# Patient Record
Sex: FEMALE | Race: WHITE | HISPANIC OR LATINO | Employment: UNEMPLOYED | URBAN - METROPOLITAN AREA
[De-identification: names, ages, dates, MRNs, and addresses within clinical notes are randomized per-mention and may not be internally consistent; named-entity substitution may affect disease eponyms.]

---

## 2021-12-22 ENCOUNTER — HOSPITAL ENCOUNTER (EMERGENCY)
Facility: CLINIC | Age: 21
Discharge: HOME | End: 2021-12-23
Attending: EMERGENCY MEDICINE | Admitting: EMERGENCY MEDICINE
Payer: COMMERCIAL

## 2021-12-22 DIAGNOSIS — J45.21 MILD INTERMITTENT ASTHMA WITH EXACERBATION: ICD-10-CM

## 2021-12-22 DIAGNOSIS — J06.9 VIRAL URI WITH COUGH: ICD-10-CM

## 2021-12-22 LAB
FLUAV RNA SPEC QL NAA+PROBE: NEGATIVE
FLUBV RNA RESP QL NAA+PROBE: NEGATIVE
SARS-COV-2 RNA RESP QL NAA+PROBE: NEGATIVE

## 2021-12-22 PROCEDURE — C9803 HOPD COVID-19 SPEC COLLECT: HCPCS

## 2021-12-22 PROCEDURE — 87636 SARSCOV2 & INF A&B AMP PRB: CPT | Performed by: EMERGENCY MEDICINE

## 2021-12-22 PROCEDURE — 99285 EMERGENCY DEPT VISIT HI MDM: CPT | Mod: 25

## 2021-12-22 ASSESSMENT — MIFFLIN-ST. JEOR: SCORE: 1732.04

## 2021-12-23 ENCOUNTER — APPOINTMENT (OUTPATIENT)
Dept: GENERAL RADIOLOGY | Facility: CLINIC | Age: 21
End: 2021-12-23
Attending: EMERGENCY MEDICINE
Payer: COMMERCIAL

## 2021-12-23 VITALS
OXYGEN SATURATION: 100 % | HEART RATE: 102 BPM | WEIGHT: 220 LBS | DIASTOLIC BLOOD PRESSURE: 81 MMHG | BODY MASS INDEX: 38.98 KG/M2 | TEMPERATURE: 98.6 F | SYSTOLIC BLOOD PRESSURE: 149 MMHG | HEIGHT: 63 IN | RESPIRATION RATE: 18 BRPM

## 2021-12-23 LAB
DEPRECATED S PYO AG THROAT QL EIA: NEGATIVE
GROUP A STREP BY PCR: NOT DETECTED

## 2021-12-23 PROCEDURE — 250N000013 HC RX MED GY IP 250 OP 250 PS 637: Performed by: EMERGENCY MEDICINE

## 2021-12-23 PROCEDURE — 71046 X-RAY EXAM CHEST 2 VIEWS: CPT

## 2021-12-23 PROCEDURE — 87651 STREP A DNA AMP PROBE: CPT | Performed by: EMERGENCY MEDICINE

## 2021-12-23 PROCEDURE — 94640 AIRWAY INHALATION TREATMENT: CPT

## 2021-12-23 RX ORDER — ALBUTEROL SULFATE 90 UG/1
2 AEROSOL, METERED RESPIRATORY (INHALATION) ONCE
Status: COMPLETED | OUTPATIENT
Start: 2021-12-23 | End: 2021-12-23

## 2021-12-23 RX ORDER — PREDNISONE 20 MG/1
TABLET ORAL
Qty: 10 TABLET | Refills: 0 | Status: CANCELLED | OUTPATIENT
Start: 2021-12-23

## 2021-12-23 RX ORDER — BENZONATATE 200 MG/1
200 CAPSULE ORAL 3 TIMES DAILY PRN
Qty: 20 CAPSULE | Refills: 0 | Status: CANCELLED | OUTPATIENT
Start: 2021-12-23

## 2021-12-23 RX ADMIN — ALBUTEROL SULFATE 2 PUFF: 90 AEROSOL, METERED RESPIRATORY (INHALATION) at 00:44

## 2021-12-23 ASSESSMENT — ENCOUNTER SYMPTOMS
COUGH: 1
FEVER: 0
SORE THROAT: 1

## 2021-12-23 NOTE — ED TRIAGE NOTES
Patient presents with complaints of a cough that began about a week ago, worsening over the past couple of days. Patient took an at-home Covid test yesterday which was negative.

## 2021-12-23 NOTE — ED PROVIDER NOTES
"  History   Chief Complaint:  Cough       The history is provided by the patient.      Ty Forte is a 21 year old female with history of asthma who presents with congestion. The patient began to experience congestion 1 week ago and reports she has begun to feel worse with time. She now additionally has a cough with intermittent mucus and has chest pain and a sore throat when coughing. She has not taken any medications for her symptoms. She denies any fever or ear pain. The patient does have a history of asthma, but reports she ran out of her inhaler a few months ago. She denies any change of pregnancy and is currently on her menstrual cycle. Denies leg pain/swelling, past history of PE/DVT.    Review of Systems   Constitutional: Negative for fever.   HENT: Positive for congestion and sore throat. Negative for ear pain.    Respiratory: Positive for cough.    Cardiovascular: Positive for chest pain.   All other systems reviewed and are negative.    Allergies:  No Known Allergies    Medications:  The patient is currently on no regular medications.    Past Medical History:     Asthma     Social History:  Presents unaccompanied.   PCP: No primary care provider on file.     Physical Exam     Patient Vitals for the past 24 hrs:   BP Temp Temp src Pulse Resp SpO2 Height Weight   12/23/21 0130 -- -- -- -- -- 100 % -- --   12/23/21 0000 (!) 149/81 -- -- -- 18 96 % -- --   12/22/21 2248 (!) 140/82 98.6  F (37  C) Temporal 102 18 97 % 1.6 m (5' 3\") 99.8 kg (220 lb)       Physical Exam  Nursing note and vitals reviewed.  Constitutional:  Appears well-developed and well-nourished. Dry sounding cough. No visible respiratory distress.  HENT:    Sinus is non tender. TM's are clear.   Head:    Atraumatic.   Mouth/Throat:   Oropharynx is clear and moist. No oropharyngeal exudate.   Eyes:    Pupils are equal, round, and reactive to light.   Neck:    Normal range of motion. Neck supple.      No tracheal deviation present. No " thyromegaly present.   Cardiovascular:  Normal rate, regular rhythm, no murmur   Pulmonary/Chest: Breath sounds are clear and equal without wheezes or crackles.  Abdominal:   Soft. Bowel sounds are normal. Exhibits no distension and      no mass. There is no tenderness.      There is no rebound and no guarding.   Musculoskeletal:  Exhibits no edema.   Lymphadenopathy:  No cervical adenopathy.   Neurological:   Alert and oriented to person, place, and time.   Skin:    Skin is warm and dry. No rash noted. No pallor.     Emergency Department Course     Imaging:  XR Chest 2 Views   Final Result   IMPRESSION: Negative chest.        Report per radiology    Laboratory:  Labs Ordered and Resulted from Time of ED Arrival to Time of ED Departure   INFLUENZA A/B & SARS-COV2 PCR MULTIPLEX - Normal       Result Value    Influenza A PCR Negative      Influenza B PCR Negative      SARS CoV2 PCR Negative     STREPTOCOCCUS A RAPID SCREEN W REFELX TO PCR - Normal    Group A Strep antigen Negative     GROUP A STREPTOCOCCUS PCR THROAT SWAB       Emergency Department Course:    Reviewed:  I reviewed nursing notes, vitals and past medical history    Assessments:  0021 I obtained history and examined the patient as noted above.   0142 I rechecked the patient and explained findings.     Interventions:  0044 Albuterol inhaler, 2 puff, Inhalation     Disposition:  The patient was discharged to home.     Impression & Plan     Medical Decision Making:  This patient has a history of asthma and symptoms consistent with acute upper respiratory infection. She tested negative for covid, influenza, and strep and I feel this is likely a viral illness exacerbating her asthma. She was given albuterol inhaler here and discharged on prednisone and tessalon pearls for cough. She was instructed to return as needed for if her symptoms worsen including difficulty breathing or chest pain. She should follow up in her primary care clinic this week. Their was no  concern for bacterial pneumonia being her chest Xray was negative and their was no symptoms concerning for pulmonary embolism. I felt she could be safely discharged.     Diagnosis:    ICD-10-CM    1. Mild intermittent asthma with exacerbation  J45.21 Primary Care Referral   2. Viral URI with cough  J06.9 Primary Care Referral       Discharge Medications:  New Prescriptions    No medications on file       Scribe Disclosure:  Susan ZAMBRANO, am serving as a scribe at 12:05 AM on 12/23/2021 to document services personally performed by Teri Montgomery MD based on my observations and the provider's statements to me.            Teri Montgomery MD  12/23/21 0431       Teri Montgomery MD  12/23/21 0432

## 2022-10-08 ENCOUNTER — NURSE TRIAGE (OUTPATIENT)
Dept: NURSING | Facility: CLINIC | Age: 22
End: 2022-10-08

## 2022-10-08 NOTE — TELEPHONE ENCOUNTER
"Patient calling with concerns of vaginal bleeding for the past 2 weeks. Patient reports that she is needing to change her pad, a thin panty liner that is meant for heavier flow, 3 times at night and 4-6 times during the day. Patient reports that she has been seeing clots, and a couple days ago it was spotting with clots. Today the bleeding is lightening up but now the cramping is more painful and only on the left side, and previously it was cramping all around. Patient is on birth control and \"has messed up her pills two weeks ago\".  Patient has stopped taking her pill completely two days ago and now it just hurts. Abdominal pain is 7/10 on left side only and has been constant for 2 hours now.   Patient has just treated with ibuprofen just prior to call  Protocol recommends see hcp within 4 hours.  Patient agrees to present to  and will call back with any worsening symptoms.   Camelia Shukla RN   10/08/22 12:02 PM  [facility] Nurse Advisor      Reason for Disposition    [1] Constant abdominal pain AND [2] present > 2 hours    Additional Information    Negative: Shock suspected (e.g., cold/pale/clammy skin, too weak to stand, low BP, rapid pulse)    Negative: Difficult to awaken or acting confused (e.g., disoriented, slurred speech)    Negative: Passed out (i.e., lost consciousness, collapsed and was not responding)    Negative: Sounds like a life-threatening emergency to the triager    Negative: Followed a genital area injury (e.g., vagina, vulva)    Negative: Pregnant 20 or more weeks    Negative: Pregnant < 20 weeks  (less than 5 months)    Negative: Postpartum (from 0 to 6 weeks after delivery)    Negative: Bleeding occurring > 12 months after menopause    Negative: Bleeding from sexual abuse or rape    Negative: [1] Vaginal discharge is main symptom AND [2] small amount of blood    Negative: SEVERE abdominal pain    Negative: SEVERE dizziness (e.g., unable to stand, requires support to walk, feels like " passing out now)    Negative: SEVERE vaginal bleeding (e.g., soaking 2 pads or tampons per hour and present 2 or more hours; 1 menstrual cup every 2 hours)    Negative: Patient sounds very sick or weak to the triager    Negative: MODERATE vaginal bleeding (e.g., soaking 1 pad or tampon per hour and present > 6 hours; 1 menstrual cup every 6 hours)    Protocols used: VAGINAL BLEEDING - [id number]

## 2022-10-25 ENCOUNTER — OFFICE VISIT (OUTPATIENT)
Dept: FAMILY MEDICINE | Facility: CLINIC | Age: 22
End: 2022-10-25
Payer: COMMERCIAL

## 2022-10-25 VITALS
RESPIRATION RATE: 16 BRPM | SYSTOLIC BLOOD PRESSURE: 122 MMHG | BODY MASS INDEX: 38.91 KG/M2 | HEIGHT: 63 IN | HEART RATE: 65 BPM | OXYGEN SATURATION: 97 % | WEIGHT: 219.6 LBS | DIASTOLIC BLOOD PRESSURE: 69 MMHG | TEMPERATURE: 98.9 F

## 2022-10-25 DIAGNOSIS — Z30.09 GENERAL COUNSELING FOR PRESCRIPTION OF ORAL CONTRACEPTIVES: ICD-10-CM

## 2022-10-25 DIAGNOSIS — Z00.00 ROUTINE HISTORY AND PHYSICAL EXAMINATION OF ADULT: Primary | ICD-10-CM

## 2022-10-25 DIAGNOSIS — Z80.49 FAMILY HISTORY OF CERVICAL CANCER: ICD-10-CM

## 2022-10-25 DIAGNOSIS — Z12.4 CERVICAL CANCER SCREENING: ICD-10-CM

## 2022-10-25 DIAGNOSIS — N92.6 IRREGULAR MENSES: ICD-10-CM

## 2022-10-25 LAB
BASOPHILS # BLD AUTO: 0 10E3/UL (ref 0–0.2)
BASOPHILS NFR BLD AUTO: 0 %
EOSINOPHIL # BLD AUTO: 0.1 10E3/UL (ref 0–0.7)
EOSINOPHIL NFR BLD AUTO: 1 %
ERYTHROCYTE [DISTWIDTH] IN BLOOD BY AUTOMATED COUNT: 13 % (ref 10–15)
HBA1C MFR BLD: 5.3 % (ref 0–5.6)
HCT VFR BLD AUTO: 40.2 % (ref 35–47)
HGB BLD-MCNC: 13.6 G/DL (ref 11.7–15.7)
IMM GRANULOCYTES # BLD: 0 10E3/UL
IMM GRANULOCYTES NFR BLD: 0 %
LYMPHOCYTES # BLD AUTO: 2.5 10E3/UL (ref 0.8–5.3)
LYMPHOCYTES NFR BLD AUTO: 27 %
MCH RBC QN AUTO: 31 PG (ref 26.5–33)
MCHC RBC AUTO-ENTMCNC: 33.8 G/DL (ref 31.5–36.5)
MCV RBC AUTO: 92 FL (ref 78–100)
MONOCYTES # BLD AUTO: 0.7 10E3/UL (ref 0–1.3)
MONOCYTES NFR BLD AUTO: 7 %
NEUTROPHILS # BLD AUTO: 6 10E3/UL (ref 1.6–8.3)
NEUTROPHILS NFR BLD AUTO: 65 %
PLATELET # BLD AUTO: 282 10E3/UL (ref 150–450)
RBC # BLD AUTO: 4.39 10E6/UL (ref 3.8–5.2)
WBC # BLD AUTO: 9.2 10E3/UL (ref 4–11)

## 2022-10-25 PROCEDURE — 99385 PREV VISIT NEW AGE 18-39: CPT | Mod: 25 | Performed by: INTERNAL MEDICINE

## 2022-10-25 PROCEDURE — 80061 LIPID PANEL: CPT | Performed by: INTERNAL MEDICINE

## 2022-10-25 PROCEDURE — 90686 IIV4 VACC NO PRSV 0.5 ML IM: CPT | Performed by: INTERNAL MEDICINE

## 2022-10-25 PROCEDURE — G0145 SCR C/V CYTO,THINLAYER,RESCR: HCPCS | Performed by: INTERNAL MEDICINE

## 2022-10-25 PROCEDURE — 90472 IMMUNIZATION ADMIN EACH ADD: CPT | Performed by: INTERNAL MEDICINE

## 2022-10-25 PROCEDURE — 90715 TDAP VACCINE 7 YRS/> IM: CPT | Performed by: INTERNAL MEDICINE

## 2022-10-25 PROCEDURE — G0124 SCREEN C/V THIN LAYER BY MD: HCPCS | Performed by: PATHOLOGY

## 2022-10-25 PROCEDURE — 90471 IMMUNIZATION ADMIN: CPT | Performed by: INTERNAL MEDICINE

## 2022-10-25 PROCEDURE — 80050 GENERAL HEALTH PANEL: CPT | Performed by: INTERNAL MEDICINE

## 2022-10-25 PROCEDURE — 36415 COLL VENOUS BLD VENIPUNCTURE: CPT | Performed by: INTERNAL MEDICINE

## 2022-10-25 PROCEDURE — 99213 OFFICE O/P EST LOW 20 MIN: CPT | Mod: 25 | Performed by: INTERNAL MEDICINE

## 2022-10-25 PROCEDURE — 83036 HEMOGLOBIN GLYCOSYLATED A1C: CPT | Performed by: INTERNAL MEDICINE

## 2022-10-25 RX ORDER — LEVONORGESTREL AND ETHINYL ESTRADIOL 0.15-0.03
1 KIT ORAL DAILY
COMMUNITY
Start: 2021-09-25 | End: 2022-12-14

## 2022-10-25 ASSESSMENT — ASTHMA QUESTIONNAIRES
ACT_TOTALSCORE: 24
ACT_TOTALSCORE: 24
QUESTION_5 LAST FOUR WEEKS HOW WOULD YOU RATE YOUR ASTHMA CONTROL: COMPLETELY CONTROLLED
QUESTION_3 LAST FOUR WEEKS HOW OFTEN DID YOUR ASTHMA SYMPTOMS (WHEEZING, COUGHING, SHORTNESS OF BREATH, CHEST TIGHTNESS OR PAIN) WAKE YOU UP AT NIGHT OR EARLIER THAN USUAL IN THE MORNING: NOT AT ALL
QUESTION_1 LAST FOUR WEEKS HOW MUCH OF THE TIME DID YOUR ASTHMA KEEP YOU FROM GETTING AS MUCH DONE AT WORK, SCHOOL OR AT HOME: NONE OF THE TIME
QUESTION_2 LAST FOUR WEEKS HOW OFTEN HAVE YOU HAD SHORTNESS OF BREATH: ONCE OR TWICE A WEEK
QUESTION_4 LAST FOUR WEEKS HOW OFTEN HAVE YOU USED YOUR RESCUE INHALER OR NEBULIZER MEDICATION (SUCH AS ALBUTEROL): NOT AT ALL

## 2022-10-25 ASSESSMENT — ENCOUNTER SYMPTOMS
PALPITATIONS: 0
CHILLS: 0
ABDOMINAL PAIN: 0
SORE THROAT: 0
ARTHRALGIAS: 0
HEMATURIA: 0
DIARRHEA: 0
SHORTNESS OF BREATH: 0
PARESTHESIAS: 0
MYALGIAS: 0
HEADACHES: 0
JOINT SWELLING: 0
NAUSEA: 0
HEMATOCHEZIA: 0
NERVOUS/ANXIOUS: 1
FREQUENCY: 0
FEVER: 0
EYE PAIN: 0
COUGH: 0
DYSURIA: 0
CONSTIPATION: 0
DIZZINESS: 0
WEAKNESS: 0
HEARTBURN: 0

## 2022-10-25 ASSESSMENT — PAIN SCALES - GENERAL: PAINLEVEL: NO PAIN (0)

## 2022-10-25 NOTE — PROGRESS NOTES
SUBJECTIVE:   CC: Reanna is an 22 year old who presents for preventive health visit.       Patient has been advised of split billing requirements and indicates understanding: Yes  Healthy Habits:     Getting at least 3 servings of Calcium per day:  NO    Bi-annual eye exam:  Yes    Dental care twice a year:  NO    Sleep apnea or symptoms of sleep apnea:  None    Diet:  Regular (no restrictions) and Breakfast skipped    Frequency of exercise:  None    Taking medications regularly:  Yes    Medication side effects:  None    PHQ-2 Total Score: 2    Additional concerns today:  Yes      Today's PHQ-2 Score:   PHQ-2 ( 1999 Pfizer) 10/25/2022   Q1: Little interest or pleasure in doing things 1   Q2: Feeling down, depressed or hopeless 1   PHQ-2 Score 2   Q1: Little interest or pleasure in doing things Several days   Q2: Feeling down, depressed or hopeless Several days   PHQ-2 Score 2       Abuse: Current or Past (Physical, Sexual or Emotional) - No  Do you feel safe in your environment? Yes    Have you ever done Advance Care Planning? (For example, a Health Directive, POLST, or a discussion with a medical provider or your loved ones about your wishes): No, advance care planning information given to patient to review.  Patient declined advance care planning discussion at this time.    Social History     Tobacco Use     Smoking status: Some Days     Types: Cigarettes, Other     Smokeless tobacco: Current   Substance Use Topics     Alcohol use: Not on file     If you drink alcohol do you typically have >3 drinks per day or >7 drinks per week? No    Alcohol Use 10/25/2022   Prescreen: >3 drinks/day or >7 drinks/week? No   Prescreen: >3 drinks/day or >7 drinks/week? -          Reviewed and updated as needed this visit by clinical staff   Tobacco  Allergies  Meds              Reviewed and updated as needed this visit by Provider                 Former PCP: none  [facility]  Problem list and medications  "reviewed and updated. See below for additional notes.  Social: smokes marijuana, no etoh    Significant FHx: mother-cervical cancer  Exercise/Diet: as per above    Pap: due  Contraception: states she is off her ocp due to irregular menses, bleeding while on ocp, last menstrual cycle 2 weeks ago, not sexually active; denies vaginal odor or discharge    Flu: due, agreeable  Tdap: due, agreeable  COVID: due for booster, she defers  HPV: due per HM, she thinks she may have had these and will check her records    Denies specific concerns or questions otherwise    Review of Systems   Constitutional: Negative for chills and fever.   HENT: Negative for congestion, ear pain, hearing loss and sore throat.    Eyes: Negative for pain and visual disturbance.   Respiratory: Negative for cough and shortness of breath.    Cardiovascular: Negative for chest pain, palpitations and peripheral edema.   Gastrointestinal: Negative for abdominal pain, constipation, diarrhea, heartburn, hematochezia and nausea.   Genitourinary: Negative for dysuria, frequency, genital sores, hematuria and urgency.   Musculoskeletal: Negative for arthralgias, joint swelling and myalgias.   Skin: Negative for rash.   Neurological: Negative for dizziness, weakness, headaches and paresthesias.   Psychiatric/Behavioral: Positive for mood changes. The patient is nervous/anxious.           OBJECTIVE:   /69 (BP Location: Left arm, Patient Position: Sitting, Cuff Size: Adult Large)   Pulse 65   Temp 98.9  F (37.2  C) (Oral)   Resp 16   Ht 1.605 m (5' 3.19\")   Wt 99.6 kg (219 lb 9.6 oz)   SpO2 97%   BMI 38.67 kg/m    Physical Exam    GENERAL APPEARANCE: AAOx3, no distress. Well developed.    RESP: Lungs CTA bilaterally. No w/r/r. No distress     CV: RRR, S1/S2 present. No m/r/c.     ABDOMEN:  soft, nontender, no distention. No rebound or guarding.     EXT: No c/c/e in lower extremities b/l. No rashes or deformities noted.    MSK: ROM and strength intact " in four extremities. No gross deformities noted.    SKIN: no suspicious lesions or rashes    NEURO: AAOx3, Motor function intact w/ 5/5 strength bilaterally to UE and LE. Sensation intact bilaterally. Speech is fluent and comprehension intact. No gait abnormalities noted.    PSYCH: appropriate mood and affect.     LYMPHATICS: No cervical adenopathy   (female): External genitalia without masses, swelling, lesions or tenderness. Vagina is pink and moist without lesions or discharge. Cervix not clearly visualized, nontender without lesions or erosions.   BREAST: bilateral exam without masses, tenderness or nipple discharge; no palpable axillary masses or adenopathy      ASSESSMENT/PLAN:   Reanna was seen today for physical.    Diagnoses and all orders for this visit:    Routine history and physical examination of adult  Tdap and Flu vaccine administered today-patient agreeable  She defers COVID booster  Will check her records on HPV vaccine and if has not yet this, I recommend she proceed with series  -     Lipid panel reflex to direct LDL Fasting; Future  -     Hemoglobin A1c; Future    Cervical cancer screening  Cervix poorly visualized. Swab attempted. If cervical cells not seen on swab, recommend she repeat this with ob/gyn given her family history. Discussed with her, she is agreeable.  -     Pap Screen only - recommended age 21 - 24 years  -     Ob/Gyn Referral; Future    General counseling for prescription of oral contraceptives  Irregular menses  Family history of cervical cancer   Recommend she establish with ob/gyn for ongoing management of her pap exam in setting of mother with cervical cancer. She will also discuss contraception plan and irregular menses with them. Referral provided and she is agreeable to call and schedule.  -     CBC with Platelets & Differential; Future  -     Comprehensive metabolic panel; Future  -     Ob/Gyn Referral; Future  -     TSH with free T4 reflex; Future    Other orders  -      TDAP VACCINE (Adacel, Boostrix)  -     INFLUENZA VACCINE IM > 6 MONTHS VALENT IIV4 (AFLURIA/FLUZONE)            She reports that she has been smoking cigarettes and other. She uses smokeless tobacco.      Counseling Resources:  ATP IV Guidelines  Pooled Cohorts Equation Calculator  Breast Cancer Risk Calculator  BRCA-Related Cancer Risk Assessment: FHS-7 Tool  FRAX Risk Assessment  ICSI Preventive Guidelines  Dietary Guidelines for Americans, 2010  USDA's MyPlate  ASA Prophylaxis  Lung CA Screening    Keara Butts DO  [facility]

## 2022-10-25 NOTE — NURSING NOTE
Prior to immunization administration, verified patients identity using patient s name and date of birth. Please see Immunization Activity for additional information.     Screening Questionnaire for Adult Immunization    Are you sick today?   No   Do you have allergies to medications, food, a vaccine component or latex?   No   Have you ever had a serious reaction after receiving a vaccination?   No   Do you have a long-term health problem with heart, lung, kidney, or metabolic disease (e.g., diabetes), asthma, a blood disorder, no spleen, complement component deficiency, a cochlear implant, or a spinal fluid leak?  Are you on long-term aspirin therapy?   No   Do you have cancer, leukemia, HIV/AIDS, or any other immune system problem?   No   Do you have a parent, brother, or sister with an immune system problem?   Yes   In the past 3 months, have you taken medications that affect  your immune system, such as prednisone, other steroids, or anticancer drugs; drugs for the treatment of rheumatoid arthritis, Crohn s disease, or psoriasis; or have you had radiation treatments?   No   Have you had a seizure, or a brain or other nervous system problem?   No   During the past year, have you received a transfusion of blood or blood    products, or been given immune (gamma) globulin or antiviral drug?   No   For women: Are you pregnant or is there a chance you could become       pregnant during the next month?   No   Have you received any vaccinations in the past 4 weeks?   No     Immunization questionnaire was positive for at least one answer.  Notified .        Per orders of Dr. Butts, injection of TDAP given by Debbie Beverly MA. Patient instructed to remain in clinic for 15 minutes afterwards, and to report any adverse reaction to me immediately.       Screening performed by Debbie Beverly MA on 10/25/2022 at 3:12 PM.

## 2022-10-26 LAB
ALBUMIN SERPL-MCNC: 3.7 G/DL (ref 3.4–5)
ALP SERPL-CCNC: 116 U/L (ref 40–150)
ALT SERPL W P-5'-P-CCNC: 40 U/L (ref 0–50)
ANION GAP SERPL CALCULATED.3IONS-SCNC: 7 MMOL/L (ref 3–14)
AST SERPL W P-5'-P-CCNC: 21 U/L (ref 0–45)
BILIRUB SERPL-MCNC: 0.5 MG/DL (ref 0.2–1.3)
BUN SERPL-MCNC: 13 MG/DL (ref 7–30)
CALCIUM SERPL-MCNC: 8.9 MG/DL (ref 8.5–10.1)
CHLORIDE BLD-SCNC: 105 MMOL/L (ref 94–109)
CHOLEST SERPL-MCNC: 168 MG/DL
CO2 SERPL-SCNC: 25 MMOL/L (ref 20–32)
CREAT SERPL-MCNC: 0.82 MG/DL (ref 0.52–1.04)
FASTING STATUS PATIENT QL REPORTED: NO
GFR SERPL CREATININE-BSD FRML MDRD: >90 ML/MIN/1.73M2
GLUCOSE BLD-MCNC: 85 MG/DL (ref 70–99)
HDLC SERPL-MCNC: 40 MG/DL
LDLC SERPL CALC-MCNC: 110 MG/DL
NONHDLC SERPL-MCNC: 128 MG/DL
POTASSIUM BLD-SCNC: 4.2 MMOL/L (ref 3.4–5.3)
PROT SERPL-MCNC: 7.9 G/DL (ref 6.8–8.8)
SODIUM SERPL-SCNC: 137 MMOL/L (ref 133–144)
TRIGL SERPL-MCNC: 91 MG/DL
TSH SERPL DL<=0.005 MIU/L-ACNC: 0.62 MU/L (ref 0.4–4)

## 2022-10-31 LAB
BKR LAB AP GYN ADEQUACY: ABNORMAL
BKR LAB AP GYN INTERPRETATION: ABNORMAL
BKR LAB AP HPV REFLEX: ABNORMAL
BKR LAB AP PREVIOUS ABNORMAL: ABNORMAL
PATH REPORT.COMMENTS IMP SPEC: ABNORMAL
PATH REPORT.COMMENTS IMP SPEC: ABNORMAL
PATH REPORT.RELEVANT HX SPEC: ABNORMAL

## 2022-11-01 ENCOUNTER — PATIENT OUTREACH (OUTPATIENT)
Dept: FAMILY MEDICINE | Facility: CLINIC | Age: 22
End: 2022-11-01

## 2022-12-07 NOTE — PROGRESS NOTES
SUBJECTIVE:                                                   Reanna Crawford is a 22 year old female who presents to clinic today for the following health issue(s):  Patient presents with:  Consult: Discuss pap results and plan     HPI:  Accompanied by her mom.    Has been on OCPs since age 16. Was having heavy periods with a lot of cramping. Is happy with her current pill. WOuld like refills. [facility]     Would like to discuss her pap results.  Vapes and uses [employer].    Has not had the HPV vaccine yet, is open to it    C/o a scabed lesion on her upper lip near nose. Is recurrent. Has remote hx of cold sores as child, this is different.    Has been stressed lately. Is open to seeing a counselor.     Completed review of PMH, SocHx, SurHx, FHx, medications, and care everywhere reviewed. Epic updated.      Patient's last menstrual period was 11/27/2022 (exact date)..     Patient is not sexually active, G0  Using not sexually active for contraception.    reports that she has been smoking cigarettes and other. She uses smokeless tobacco.  Tobacco Cessation Action Plan: Information offered: Patient not interested at this time  STD testing offered?  Declined    Health maintenance updated:  yes    Today's PHQ-2 Score:   PHQ-2 ( 1999 Pfizer) 10/25/2022   Q1: Little interest or pleasure in doing things 1   Q2: Feeling down, depressed or hopeless 1   PHQ-2 Score 2   Q1: Little interest or pleasure in doing things Several days   Q2: Feeling down, depressed or hopeless Several days   PHQ-2 Score 2     Today's PHQ-9 Score:   PHQ-9 SCORE 12/14/2022   PHQ-9 Total Score 12     Today's COLT-7 Score:   COLT-7 SCORE 12/14/2022   Total Score 12       Problem list and histories reviewed & adjusted, as indicated.  Additional history: as documented.    Patient Active Problem List   Diagnosis     Family history of cervical cancer     LGSIL on Pap smear of cervix     Past Surgical History:   Procedure Laterality  "Date     head staples      age 4, had fishing hook in head     MOUTH SURGERY      had two front teeth pulled out age 4      Social History     Tobacco Use     Smoking status: Some Days     Types: Cigarettes, Other     Smokeless tobacco: Current   Substance Use Topics     Alcohol use: Yes      Problem (# of Occurrences) Relation (Name,Age of Onset)    Diabetes (2) Mother, Father    Heart Disease (1) Maternal Grandmother    Hypertension (2) Mother, Maternal Grandmother    Hyperlipidemia (3) Brother, Maternal Grandmother, Maternal Grandfather    Uterine Cancer (1) Mother    No Known Problems (4) Sister, Paternal Grandmother, Paternal Grandfather, Other            Current Outpatient Medications   Medication Sig     levonorgestrel-ethinyl estradiol (NORDETTE) 0.15-30 MG-MCG tablet Take 1 tablet by mouth daily     mupirocin (BACTROBAN) 2 % external ointment Apply topically 3 times daily for 5 days . Wash hands after application.     No current facility-administered medications for this visit.     No Known Allergies    ROS:  12 point review of systems negative other than symptoms noted below or in the HPI.        OBJECTIVE:     /70   Ht 1.6 m (5' 3\")   Wt 97.5 kg (215 lb)   LMP 11/27/2022 (Exact Date)   BMI 38.09 kg/m    Body mass index is 38.09 kg/m .    Exam:  Constitutional:  Appearance: Well nourished, well developed alert, in no acute distress  Skin: General Inspection:  No rashes present, 5mm scabbed flat erythematous lesion just under left nostril near midline present, no areas of discoloration.  Neurologic:  Mental Status:  Oriented X3.  Normal strength and tone, sensory exam grossly normal, mentation intact and speech normal.    Psychiatric:  Mentation appears normal and affect normal/bright.     ASSESSMENT/PLAN:                                                        ICD-10-CM    1. Low grade squamous intraepithelial lesion on cytologic smear of cervix (LGSIL)  R87.612       2. Depression, unspecified " depression type  F32.A Adult Mental Health  Referral      3. Impetigo  L01.00 mupirocin (BACTROBAN) 2 % external ointment      4. OCP (oral contraceptive pills) initiation  Z30.011 levonorgestrel-ethinyl estradiol (NORDETTE) 0.15-30 MG-MCG tablet      5. Need for HPV vaccine  Z23       6. Need for HPV vaccination  Z23 HPV, IM (9 - 26 YRS) - Gardasil 9          Patient Instructions   To the Emergency Department as needed or call after hours crisis line at [phone] or [phone]. [place] Crisis Text Line: Text [place] to 480386  or  Suicide LifeLine Chat: [email or web address]/chat/    To the Emergency Department as needed or call after hours crisis line at [phone] or [phone]. [place] Crisis Text Line: Text [place] to 175339  or  Suicide LifeLine Chat: [email or web address]     Community Resources:    National Suicide Prevention Lifeline: 339.712.3924 (TTY: 208.497.3723). Call anytime for help.  (www.suicidepreventionlifeline.org)  National [place] on Mental Illness (www.[place].org): 785.943.8799 or 526-689-9559.   Mental Health Association (www.mentalhealth.org): 905.789.6759 or 070-169-8020.  [place] Crisis Text Line: Text [place] to 560534  Suicide LifeLine Chat: [email or web address]        -Impetigo, chronic. Rx topical mupirocin  -Discussed HPV, pap screening, transmission, vaccine, lifestyle changes such as stopping smoking/vaping, sleep.   REcommend repeat pap in one year  -DIcussed HPV vaccine, risks and benefits. Open to HPV vaccine today. Reviewed the 3 step series  -Depression. Referral to therapist. Resources given for crisis situations, discussed she may call us as well.   -Refill OCP, doing well.     (40 minutes was spent on the date of the encounter doing chart review, review and interpretation of pertinent pap test results, history and/or exam, documentation, patient counseling.)    Kerri Heard Masters, [facility] [place]

## 2022-12-14 ENCOUNTER — OFFICE VISIT (OUTPATIENT)
Dept: OBGYN | Facility: CLINIC | Age: 22
End: 2022-12-14
Payer: COMMERCIAL

## 2022-12-14 VITALS
HEIGHT: 63 IN | SYSTOLIC BLOOD PRESSURE: 122 MMHG | WEIGHT: 215 LBS | DIASTOLIC BLOOD PRESSURE: 70 MMHG | BODY MASS INDEX: 38.09 KG/M2

## 2022-12-14 DIAGNOSIS — L01.00 IMPETIGO: ICD-10-CM

## 2022-12-14 DIAGNOSIS — Z23 NEED FOR HPV VACCINE: ICD-10-CM

## 2022-12-14 DIAGNOSIS — F32.A DEPRESSION, UNSPECIFIED DEPRESSION TYPE: ICD-10-CM

## 2022-12-14 DIAGNOSIS — Z23 NEED FOR HPV VACCINATION: ICD-10-CM

## 2022-12-14 DIAGNOSIS — R87.612 LOW GRADE SQUAMOUS INTRAEPITHELIAL LESION ON CYTOLOGIC SMEAR OF CERVIX (LGSIL): Primary | ICD-10-CM

## 2022-12-14 DIAGNOSIS — Z30.011 OCP (ORAL CONTRACEPTIVE PILLS) INITIATION: ICD-10-CM

## 2022-12-14 PROCEDURE — 99204 OFFICE O/P NEW MOD 45 MIN: CPT | Mod: 25 | Performed by: OBSTETRICS & GYNECOLOGY

## 2022-12-14 PROCEDURE — 90471 IMMUNIZATION ADMIN: CPT | Performed by: OBSTETRICS & GYNECOLOGY

## 2022-12-14 PROCEDURE — 90651 9VHPV VACCINE 2/3 DOSE IM: CPT | Performed by: OBSTETRICS & GYNECOLOGY

## 2022-12-14 RX ORDER — LEVONORGESTREL AND ETHINYL ESTRADIOL 0.15-0.03
1 KIT ORAL DAILY
Qty: 84 TABLET | Refills: 4 | Status: SHIPPED | OUTPATIENT
Start: 2022-12-14 | End: 2023-07-31

## 2022-12-14 RX ORDER — MUPIROCIN 20 MG/G
OINTMENT TOPICAL 3 TIMES DAILY
Qty: 1 G | Refills: 4 | Status: SHIPPED | OUTPATIENT
Start: 2022-12-14 | End: 2022-12-19

## 2022-12-14 ASSESSMENT — ANXIETY QUESTIONNAIRES
IF YOU CHECKED OFF ANY PROBLEMS ON THIS QUESTIONNAIRE, HOW DIFFICULT HAVE THESE PROBLEMS MADE IT FOR YOU TO DO YOUR WORK, TAKE CARE OF THINGS AT HOME, OR GET ALONG WITH OTHER PEOPLE: SOMEWHAT DIFFICULT
5. BEING SO RESTLESS THAT IT IS HARD TO SIT STILL: SEVERAL DAYS
2. NOT BEING ABLE TO STOP OR CONTROL WORRYING: SEVERAL DAYS
1. FEELING NERVOUS, ANXIOUS, OR ON EDGE: SEVERAL DAYS
GAD7 TOTAL SCORE: 12
7. FEELING AFRAID AS IF SOMETHING AWFUL MIGHT HAPPEN: MORE THAN HALF THE DAYS
3. WORRYING TOO MUCH ABOUT DIFFERENT THINGS: MORE THAN HALF THE DAYS
GAD7 TOTAL SCORE: 12
6. BECOMING EASILY ANNOYED OR IRRITABLE: NEARLY EVERY DAY

## 2022-12-14 ASSESSMENT — PATIENT HEALTH QUESTIONNAIRE - PHQ9
5. POOR APPETITE OR OVEREATING: MORE THAN HALF THE DAYS
SUM OF ALL RESPONSES TO PHQ QUESTIONS 1-9: 12

## 2022-12-14 NOTE — PATIENT INSTRUCTIONS
To the Emergency Department as needed or call after hours crisis line at [phone] or [phone]. [place] Crisis Text Line: Text [place] to 694603  or  Suicide LifeLine Chat: [email or web address]/chat/    To the Emergency Department as needed or call after hours crisis line at [phone] or [phone]. [place] Crisis Text Line: Text [place] to 452819  or  Suicide LifeLine Chat: [email or web address]     Community Resources:    National Suicide Prevention Lifeline: 448.157.9548 (TTY: 399.149.5233). Call anytime for help.  (www.suicidepreventionlifeline.org)  National [place] on Mental Illness (www.[place].org): 381.434.2275 or 584-538-6588.   Mental Health Association (www.mentalhealth.org): 513.944.6389 or 288-302-8930.  [place] Crisis Text Line: Text [place] to 453493  Suicide LifeLine Chat: [email or web address]/chat

## 2023-02-08 DIAGNOSIS — Z23 NEED FOR HPV VACCINE: Primary | ICD-10-CM

## 2023-02-15 ENCOUNTER — ALLIED HEALTH/NURSE VISIT (OUTPATIENT)
Dept: NURSING | Facility: CLINIC | Age: 23
End: 2023-02-15
Payer: COMMERCIAL

## 2023-02-15 DIAGNOSIS — Z23 NEED FOR HPV VACCINE: ICD-10-CM

## 2023-02-15 PROCEDURE — 99207 PR NO CHARGE NURSE ONLY: CPT

## 2023-02-15 PROCEDURE — 90471 IMMUNIZATION ADMIN: CPT

## 2023-02-15 PROCEDURE — 90651 9VHPV VACCINE 2/3 DOSE IM: CPT

## 2023-02-15 NOTE — PROGRESS NOTES
Prior to immunization administration, verified patients identity using patient s name and date of birth. Please see Immunization Activity for additional information.     Screening Questionnaire for Adult Immunization    Are you sick today?   No   Do you have allergies to medications, food, a vaccine component or latex?   No   Have you ever had a serious reaction after receiving a vaccination?   No   Do you have a long-term health problem with heart, lung, kidney, or metabolic disease (e.g., diabetes), asthma, a blood disorder, no spleen, complement component deficiency, a cochlear implant, or a spinal fluid leak?  Are you on long-term aspirin therapy?   No   Do you have cancer, leukemia, HIV/AIDS, or any other immune system problem?   No   Do you have a parent, brother, or sister with an immune system problem?   No   In the past 3 months, have you taken medications that affect  your immune system, such as prednisone, other steroids, or anticancer drugs; drugs for the treatment of rheumatoid arthritis, Crohn s disease, or psoriasis; or have you had radiation treatments?   No   Have you had a seizure, or a brain or other nervous system problem?   No   During the past year, have you received a transfusion of blood or blood    products, or been given immune (gamma) globulin or antiviral drug?   No   For women: Are you pregnant or is there a chance you could become       pregnant during the next month?   No   Have you received any vaccinations in the past 4 weeks?   No     Immunization questionnaire answers were all negative.        Per orders of Dr. Hebert, injection of Gardasil given by Tiffanie Valero CMA. Patient instructed to remain in clinic for 15 minutes afterwards, and to report any adverse reaction to me immediately.       Screening performed by Tiffanie Valero CMA on 2/15/2023 at 10:01 AM.

## 2023-06-14 ENCOUNTER — ALLIED HEALTH/NURSE VISIT (OUTPATIENT)
Dept: OBGYN | Facility: CLINIC | Age: 23
End: 2023-06-14
Payer: COMMERCIAL

## 2023-06-14 DIAGNOSIS — Z23 NEED FOR HPV VACCINE: ICD-10-CM

## 2023-06-14 PROCEDURE — 99207 PR NO CHARGE NURSE ONLY: CPT

## 2023-06-14 PROCEDURE — 90471 IMMUNIZATION ADMIN: CPT

## 2023-06-14 PROCEDURE — 90651 9VHPV VACCINE 2/3 DOSE IM: CPT

## 2023-07-10 DIAGNOSIS — Z30.011 OCP (ORAL CONTRACEPTIVE PILLS) INITIATION: ICD-10-CM

## 2023-07-10 RX ORDER — LEVONORGESTREL AND ETHINYL ESTRADIOL 0.15-0.03
1 KIT ORAL DAILY
Qty: 84 TABLET | Refills: 4 | OUTPATIENT
Start: 2023-07-10

## 2023-07-10 NOTE — TELEPHONE ENCOUNTER
"Requested Prescriptions   Pending Prescriptions Disp Refills     levonorgestrel-ethinyl estradiol (NORDETTE) 0.15-30 MG-MCG tablet 84 tablet 4     Sig: Take 1 tablet by mouth daily       Contraceptives Protocol Passed - 7/10/2023  8:21 AM        Passed - Patient is not a current smoker if age is 35 or older        Passed - Recent (12 mo) or future (30 days) visit within the authorizing provider's specialty     Patient has had an office visit with the authorizing provider or a provider within the authorizing providers department within the previous 12 mos or has a future within next 30 days. See \"Patient Info\" tab in inbasket, or \"Choose Columns\" in Meds & Orders section of the refill encounter.              Passed - Medication is active on med list        Passed - No active pregnancy on record        Passed - No positive pregnancy test in past 12 months           Refills available  Eileen Dewey RN on 7/10/2023 at 9:20 AM    "

## 2023-07-31 DIAGNOSIS — Z30.011 OCP (ORAL CONTRACEPTIVE PILLS) INITIATION: ICD-10-CM

## 2023-07-31 RX ORDER — LEVONORGESTREL AND ETHINYL ESTRADIOL 0.15-0.03
1 KIT ORAL DAILY
Qty: 112 TABLET | Refills: 1 | Status: SHIPPED | OUTPATIENT
Start: 2023-07-31 | End: 2023-10-17

## 2023-07-31 NOTE — TELEPHONE ENCOUNTER
Reason for call:  Medication   If this is a refill request, has the caller requested the refill from the pharmacy already? Yes  Will the patient be using a [facility]? N/A  Name of the pharmacy and phone number for the current request: [facility]    Name of the medication requested: Nordette (bc pill)    Other request: Pt called stating that she had previously requested a refill for this rx but it was denied saying refills remaining. Pt states that she has been continously taking the pill and skipping her periods so she is now out of the med and needs a refill. Pt asks if we can write the refill to reflect continual usage?    Phone number to reach patient:  Cell number on file:    Telephone Information:   Mobile [phone]   Mobile [phone]       Best Time:  any    Can we leave a detailed message on this number?  YES    Travel screening: Not Applicable

## 2023-07-31 NOTE — TELEPHONE ENCOUNTER
"Requested Prescriptions   Signed Prescriptions Disp Refills    levonorgestrel-ethinyl estradiol (NORDETTE) 0.15-30 MG-MCG tablet 112 tablet 1     Sig: Take 1 tablet by mouth daily Take active pills continuously       Contraceptives Protocol Passed - 7/31/2023  8:43 AM        Passed - Patient is not a current smoker if age is 35 or older        Passed - Recent (12 mo) or future (30 days) visit within the authorizing provider's specialty     Patient has had an office visit with the authorizing provider or a provider within the authorizing providers department within the previous 12 mos or has a future within next 30 days. See \"Patient Info\" tab in inbasket, or \"Choose Columns\" in Meds & Orders section of the refill encounter.              Passed - Medication is active on med list        Passed - No active pregnancy on record        Passed - No positive pregnancy test in past 12 months               Prescription approved per [facility] Refill Protocol.    Jacqueline Kahn RN on 7/31/2023 at 10:38 AM    "

## 2023-09-12 ENCOUNTER — OFFICE VISIT (OUTPATIENT)
Dept: FAMILY MEDICINE | Facility: CLINIC | Age: 23
End: 2023-09-12
Payer: COMMERCIAL

## 2023-09-12 VITALS
RESPIRATION RATE: 16 BRPM | DIASTOLIC BLOOD PRESSURE: 68 MMHG | HEART RATE: 71 BPM | TEMPERATURE: 98.7 F | BODY MASS INDEX: 34.77 KG/M2 | WEIGHT: 196.3 LBS | OXYGEN SATURATION: 96 % | SYSTOLIC BLOOD PRESSURE: 109 MMHG

## 2023-09-12 DIAGNOSIS — Z79.899 MEDICATION MANAGEMENT: ICD-10-CM

## 2023-09-12 DIAGNOSIS — Z13.29 SCREENING FOR THYROID DISORDER: ICD-10-CM

## 2023-09-12 DIAGNOSIS — G43.109 MIGRAINE WITH AURA AND WITHOUT STATUS MIGRAINOSUS, NOT INTRACTABLE: ICD-10-CM

## 2023-09-12 DIAGNOSIS — R11.0 NAUSEA: Primary | ICD-10-CM

## 2023-09-12 DIAGNOSIS — Z11.59 NEED FOR HEPATITIS C SCREENING TEST: ICD-10-CM

## 2023-09-12 DIAGNOSIS — Z12.4 CERVICAL CANCER SCREENING: ICD-10-CM

## 2023-09-12 DIAGNOSIS — F41.9 ANXIETY: ICD-10-CM

## 2023-09-12 DIAGNOSIS — Z23 NEED FOR PROPHYLACTIC VACCINATION AND INOCULATION AGAINST INFLUENZA: ICD-10-CM

## 2023-09-12 DIAGNOSIS — Z11.4 SCREENING FOR HIV (HUMAN IMMUNODEFICIENCY VIRUS): ICD-10-CM

## 2023-09-12 DIAGNOSIS — R10.13 EPIGASTRIC PAIN: ICD-10-CM

## 2023-09-12 LAB
ALBUMIN SERPL BCG-MCNC: 4.3 G/DL (ref 3.5–5.2)
ALP SERPL-CCNC: 80 U/L (ref 35–104)
ALT SERPL W P-5'-P-CCNC: 20 U/L (ref 0–50)
ANION GAP SERPL CALCULATED.3IONS-SCNC: 9 MMOL/L (ref 7–15)
AST SERPL W P-5'-P-CCNC: 23 U/L (ref 0–45)
BILIRUB SERPL-MCNC: 0.4 MG/DL
BUN SERPL-MCNC: 8.3 MG/DL (ref 6–20)
CALCIUM SERPL-MCNC: 9.3 MG/DL (ref 8.6–10)
CHLORIDE SERPL-SCNC: 107 MMOL/L (ref 98–107)
CREAT SERPL-MCNC: 0.94 MG/DL (ref 0.51–0.95)
DEPRECATED HCO3 PLAS-SCNC: 24 MMOL/L (ref 22–29)
EGFRCR SERPLBLD CKD-EPI 2021: 87 ML/MIN/1.73M2
ERYTHROCYTE [DISTWIDTH] IN BLOOD BY AUTOMATED COUNT: 13.3 % (ref 10–15)
GLUCOSE SERPL-MCNC: 87 MG/DL (ref 70–99)
HCT VFR BLD AUTO: 41.1 % (ref 35–47)
HCV AB SERPL QL IA: NONREACTIVE
HGB BLD-MCNC: 13.3 G/DL (ref 11.7–15.7)
HIV 1+2 AB+HIV1 P24 AG SERPL QL IA: NONREACTIVE
MCH RBC QN AUTO: 30.9 PG (ref 26.5–33)
MCHC RBC AUTO-ENTMCNC: 32.4 G/DL (ref 31.5–36.5)
MCV RBC AUTO: 95 FL (ref 78–100)
PLATELET # BLD AUTO: 261 10E3/UL (ref 150–450)
POTASSIUM SERPL-SCNC: 4.4 MMOL/L (ref 3.4–5.3)
PROT SERPL-MCNC: 7.5 G/DL (ref 6.4–8.3)
RBC # BLD AUTO: 4.31 10E6/UL (ref 3.8–5.2)
SODIUM SERPL-SCNC: 140 MMOL/L (ref 136–145)
TSH SERPL DL<=0.005 MIU/L-ACNC: 0.6 UIU/ML (ref 0.3–4.2)
WBC # BLD AUTO: 7.7 10E3/UL (ref 4–11)

## 2023-09-12 PROCEDURE — 90471 IMMUNIZATION ADMIN: CPT | Performed by: INTERNAL MEDICINE

## 2023-09-12 PROCEDURE — 99215 OFFICE O/P EST HI 40 MIN: CPT | Mod: 25 | Performed by: INTERNAL MEDICINE

## 2023-09-12 PROCEDURE — 84443 ASSAY THYROID STIM HORMONE: CPT | Performed by: INTERNAL MEDICINE

## 2023-09-12 PROCEDURE — 80053 COMPREHEN METABOLIC PANEL: CPT | Performed by: INTERNAL MEDICINE

## 2023-09-12 PROCEDURE — 85027 COMPLETE CBC AUTOMATED: CPT | Performed by: INTERNAL MEDICINE

## 2023-09-12 PROCEDURE — 90686 IIV4 VACC NO PRSV 0.5 ML IM: CPT | Performed by: INTERNAL MEDICINE

## 2023-09-12 PROCEDURE — 86803 HEPATITIS C AB TEST: CPT | Performed by: INTERNAL MEDICINE

## 2023-09-12 PROCEDURE — 87389 HIV-1 AG W/HIV-1&-2 AB AG IA: CPT | Performed by: INTERNAL MEDICINE

## 2023-09-12 PROCEDURE — 36415 COLL VENOUS BLD VENIPUNCTURE: CPT | Performed by: INTERNAL MEDICINE

## 2023-09-12 RX ORDER — OMEPRAZOLE 20 MG/1
20 TABLET, DELAYED RELEASE ORAL DAILY
Qty: 30 TABLET | Refills: 1 | Status: SHIPPED | OUTPATIENT
Start: 2023-09-12 | End: 2023-10-17

## 2023-09-12 RX ORDER — SUMATRIPTAN 50 MG/1
50 TABLET, FILM COATED ORAL
Qty: 9 TABLET | Refills: 1 | Status: SHIPPED | OUTPATIENT
Start: 2023-09-12 | End: 2023-10-17

## 2023-09-12 RX ORDER — PROGESTERONE 100 MG/1
100 CAPSULE ORAL AT BEDTIME
COMMUNITY
Start: 2023-08-25 | End: 2023-10-17

## 2023-09-12 RX ORDER — CIPROFLOXACIN 250 MG/1
1 TABLET, FILM COATED ORAL
COMMUNITY
Start: 2023-09-07 | End: 2023-10-17

## 2023-09-12 RX ORDER — PROPRANOLOL HYDROCHLORIDE 10 MG/1
10 TABLET ORAL 2 TIMES DAILY
Qty: 60 TABLET | Refills: 3 | Status: SHIPPED | OUTPATIENT
Start: 2023-09-12 | End: 2023-10-17

## 2023-09-12 RX ORDER — ONDANSETRON 4 MG/1
4 TABLET, ORALLY DISINTEGRATING ORAL EVERY 8 HOURS PRN
Qty: 15 TABLET | Refills: 1 | Status: SHIPPED | OUTPATIENT
Start: 2023-09-12

## 2023-09-12 ASSESSMENT — PAIN SCALES - GENERAL: PAINLEVEL: NO PAIN (0)

## 2023-09-12 ASSESSMENT — ASTHMA QUESTIONNAIRES: ACT_TOTALSCORE: 21

## 2023-09-12 NOTE — RESULT ENCOUNTER NOTE
Ernst Forte    This is to inform you regarding your test result.    HIV 1&2 Antibody is negative.  Hepatitis C Antibody is negative.  TSH which is thyroid hormone is normal.  The testing of your blood sugar, kidney function, liver function and electrolytes was normal.  CBC result which includes white count Hemoglobin and  Platelet Counts is normal.       Sincerely,      Dr.Nasima Manoj MD,FACP

## 2023-09-12 NOTE — PATIENT INSTRUCTIONS
Lab work today  Start taking omeprazole 20 mg daily for stomach  Use zofran as needed for nausea  Please call radiology by dialing  [phone] to schedule your ultrasound   Start taking propranolol  10 mg twice a day for prevention of headache .  Use Imitrex when headache comes on as soon as you know prn  You are due for pap test so make appointment with your gynecologist     Follow up in 4-6 weeks     Seek sooner medical attention if there is any worsening of symptoms or problems.

## 2023-09-12 NOTE — PROGRESS NOTES
Assessment & Plan     Reanna was seen today for nausea.    Diagnoses and all orders for this visit:    Nausea  -     CBC with platelets; Future  -     Comprehensive metabolic panel; Future  -     omeprazole (PRILOSEC OTC) 20 MG EC tablet; Take 1 tablet (20 mg) by mouth daily  -     ondansetron (ZOFRAN ODT) 4 MG ODT tab; Take 1 tablet (4 mg) by mouth every 8 hours as needed for nausea  -     US Abdomen Limited; Future  She is feeling nauseous for the last 1 month  It is getting worse  It is intermittent she is also having some epigastric discomfort  No vomiting or fever, bowel movements are okay  I ordered lab work as well as ultrasound  I put her on omeprazole and gave her nausea medication  If needed I will refer her to GI   she will follow-up with me in 4 to 6 weeks  She is on oral contraceptive as well as progesterone  Oral contraceptive was given to her by her gynecologist but progesterone was given to her by her hormone doctor  I am not sure why she is taking both  She is also on Cipro for 10 days for UTI  Usually short courses should be prescribed  But again this is from her other hormone doctor    Epigastric pain  -     Comprehensive metabolic panel; Future  -     omeprazole (PRILOSEC OTC) 20 MG EC tablet; Take 1 tablet (20 mg) by mouth daily  -     US Abdomen Limited; Future  See the note above    Migraine with aura and without status migrainosus, not intractable  -     propranolol (INDERAL) 10 MG tablet; Take 1 tablet (10 mg) by mouth 2 times daily to prevent migraine  -     SUMAtriptan (IMITREX) 50 MG tablet; Take 1 tablet (50 mg) by mouth at onset of headache for migraine May repeat in 2 hours. Max 4 tablets/24 hours.  She has longstanding history of migraine headache and sometimes it can happen 4 times a week   she usually takes over-the-counter stuff  She has family history of migraine in her mother  I discussed preventative and abortive treatment  Educated her about it  I wanted to give her  "topiramate but it would interact with her oral contraceptive so she is not interested in that  She agreed to try propranolol as it will help with anxiety as well as it will help with prevention of migraine  Will titrate the dose slowly  Follow-up in 4 to 6 weeks  Educated her about taking Imitrex as soon as the headache comes  Explained that it will take time to kick in as far as propranolol is concerned    Anxiety  She is longstanding history of using weed for recreational purposes and to treat her anxiety   marijuana can also make person nauseous  But she does not agree as she has been using it for long time to treat her anxiety    Screening for HIV (human immunodeficiency virus)  -     HIV Antigen Antibody Combo; Future    Need for hepatitis C screening test  -     Hepatitis C Screen Reflex to HCV RNA Quant and Genotype; Future    Cervical cancer screening  Reminded her about scheduling an appointment with her gynecologist Dr. Hebert    Screening for thyroid disorder  -     TSH with free T4 reflex; Future    Medication management  -     Comprehensive metabolic panel; Future    Other orders  -     REVIEW OF HEALTH MAINTENANCE PROTOCOL ORDERS     Preventive health counseling was also done.       BMI:   Estimated body mass index is 34.77 kg/m  as calculated from the following:    Height as of 12/14/22: 1.6 m (5' 3\").    Weight as of this encounter: 89 kg (196 lb 4.8 oz).       See Patient Instructions  Patient Instructions   Lab work today  Start taking omeprazole 20 mg daily for stomach  Use zofran as needed for nausea  Please call radiology by dialing  [phone] to schedule your ultrasound   Start taking propranolol  10 mg twice a day for prevention of headache .  Use Imitrex when headache comes on as soon as you know prn  You are due for pap test so make appointment with your gynecologist     Follow up in 4-6 weeks     Seek sooner medical attention if there is any worsening of symptoms or " problems.        Alice Aranda MD  [facility] KIRAN Forte is a 23 year old, presenting for the following health issues:  Nausea      History of Present Illness       Reason for visit:  Nausea  Symptom onset:  3-4 weeks ago    She eats 2-3 servings of fruits and vegetables daily.She consumes 3 sweetened beverage(s) daily.She exercises with enough effort to increase her heart rate 10 to 19 minutes per day.  She exercises with enough effort to increase her heart rate 6 days per week.   She is taking medications regularly.     Feeling nauseous  Follows hormonal doctor    Taking Prometrium for one week  No fever   Bm are ok   Nausea mostly every morning   History of migraine  Use to take ibuprofen   Acetaminophen            Review of Systems   Constitutional, HEENT, cardiovascular, pulmonary, GI, , musculoskeletal, neuro, skin, endocrine and psych systems are negative, except as otherwise noted.        Objective    /68 (BP Location: Right arm, Patient Position: Sitting, Cuff Size: Adult Regular)   Pulse 71   Temp 98.7  F (37.1  C) (Oral)   Resp 16   Wt 89 kg (196 lb 4.8 oz)   LMP 08/25/2023 (Approximate)   SpO2 96%   BMI 34.77 kg/m    Body mass index is 34.77 kg/m .  Physical Exam       GENERAL APPEARANCE: healthy, alert and no distress  EYES: Eyes grossly normal to inspection, PERRL and conjunctivae and sclerae normal  HENT: ear canals and TM's normal and nose and mouth without ulcers or lesions  NECK: no adenopathy  RESP: lungs clear to auscultation - no rales, rhonchi or wheezes  CV: regular rates and rhythm, normal S1 S2, no S3      40 minutes spent on the date of the encounter doing chart review, history and exam, documentation and further activities as noted above    Disclaimer: This note consists of symbols derived from keyboarding, dictation and/or voice recognition software. As a result, there may be errors in the script that have gone undetected. Please  consider this when interpreting information found in this chart.

## 2023-10-17 ENCOUNTER — OFFICE VISIT (OUTPATIENT)
Dept: OBGYN | Facility: CLINIC | Age: 23
End: 2023-10-17
Payer: COMMERCIAL

## 2023-10-17 VITALS
HEIGHT: 64 IN | WEIGHT: 198 LBS | BODY MASS INDEX: 33.8 KG/M2 | DIASTOLIC BLOOD PRESSURE: 68 MMHG | SYSTOLIC BLOOD PRESSURE: 110 MMHG

## 2023-10-17 DIAGNOSIS — N89.8 VAGINAL ODOR: ICD-10-CM

## 2023-10-17 DIAGNOSIS — R87.612 LOW GRADE SQUAMOUS INTRAEPITHELIAL LESION ON CYTOLOGIC SMEAR OF CERVIX (LGSIL): Primary | ICD-10-CM

## 2023-10-17 DIAGNOSIS — Z30.011 OCP (ORAL CONTRACEPTIVE PILLS) INITIATION: ICD-10-CM

## 2023-10-17 DIAGNOSIS — F32.A DEPRESSION, UNSPECIFIED DEPRESSION TYPE: ICD-10-CM

## 2023-10-17 DIAGNOSIS — G47.8 POOR SLEEP PATTERN: ICD-10-CM

## 2023-10-17 DIAGNOSIS — Z12.4 SCREENING FOR CERVICAL CANCER: ICD-10-CM

## 2023-10-17 LAB
BACTERIAL VAGINOSIS VAG-IMP: NEGATIVE
CANDIDA DNA VAG QL NAA+PROBE: NOT DETECTED
CANDIDA GLABRATA / CANDIDA KRUSEI DNA: NOT DETECTED
T VAGINALIS DNA VAG QL NAA+PROBE: NOT DETECTED

## 2023-10-17 PROCEDURE — G0145 SCR C/V CYTO,THINLAYER,RESCR: HCPCS | Performed by: OBSTETRICS & GYNECOLOGY

## 2023-10-17 PROCEDURE — 0352U MULTIPLEX VAGINAL PANEL BY PCR: CPT | Performed by: OBSTETRICS & GYNECOLOGY

## 2023-10-17 PROCEDURE — G0124 SCREEN C/V THIN LAYER BY MD: HCPCS

## 2023-10-17 PROCEDURE — 99395 PREV VISIT EST AGE 18-39: CPT | Performed by: OBSTETRICS & GYNECOLOGY

## 2023-10-17 PROCEDURE — 99213 OFFICE O/P EST LOW 20 MIN: CPT | Mod: 25 | Performed by: OBSTETRICS & GYNECOLOGY

## 2023-10-17 RX ORDER — LEVONORGESTREL AND ETHINYL ESTRADIOL 0.15-0.03
1 KIT ORAL DAILY
Qty: 112 TABLET | Refills: 3 | Status: SHIPPED | OUTPATIENT
Start: 2023-10-17 | End: 2024-09-30

## 2023-10-17 RX ORDER — ALBUTEROL SULFATE 90 UG/1
1-2 AEROSOL, METERED RESPIRATORY (INHALATION)
COMMUNITY
Start: 2023-09-11

## 2023-10-17 ASSESSMENT — ANXIETY QUESTIONNAIRES
GAD7 TOTAL SCORE: 13
6. BECOMING EASILY ANNOYED OR IRRITABLE: MORE THAN HALF THE DAYS
GAD7 TOTAL SCORE: 13
2. NOT BEING ABLE TO STOP OR CONTROL WORRYING: MORE THAN HALF THE DAYS
7. FEELING AFRAID AS IF SOMETHING AWFUL MIGHT HAPPEN: MORE THAN HALF THE DAYS
3. WORRYING TOO MUCH ABOUT DIFFERENT THINGS: MORE THAN HALF THE DAYS
5. BEING SO RESTLESS THAT IT IS HARD TO SIT STILL: MORE THAN HALF THE DAYS
1. FEELING NERVOUS, ANXIOUS, OR ON EDGE: SEVERAL DAYS
IF YOU CHECKED OFF ANY PROBLEMS ON THIS QUESTIONNAIRE, HOW DIFFICULT HAVE THESE PROBLEMS MADE IT FOR YOU TO DO YOUR WORK, TAKE CARE OF THINGS AT HOME, OR GET ALONG WITH OTHER PEOPLE: VERY DIFFICULT

## 2023-10-17 ASSESSMENT — PATIENT HEALTH QUESTIONNAIRE - PHQ9
SUM OF ALL RESPONSES TO PHQ QUESTIONS 1-9: 10
5. POOR APPETITE OR OVEREATING: MORE THAN HALF THE DAYS

## 2023-10-17 NOTE — PROGRESS NOTES
Reanna is a 23 year old  female who presents for annual exam.     Besides routine health maintenance, she has no other health concerns today .    HPI:  The patient's PCP is     Reports on and off vaginal odor.  Started noticing this about 1-2 months ago.  No over the counter infection before.  No history of BV.    Doesn't always do continuous birth control. Depends on if she will be with her partner when she is due for cycle.    Has history depression/anxiety but has not been on any medications.  Does feel she has good support    Has a lot of troubles with sleep and if doesn't sleep well gets worsening headaches.  Has never done sleep study. States will wake up multiple times in a night. Works with a [employer] office. Was started on progesterone in addition to her OCP to help with sleep.  Thinks it might have helped, but reports abnormal period.     GYNECOLOGIC HISTORY:    Patient's last menstrual period was 10/10/2023 (approximate).    Regular menses? no  Length of menses: 3 days    Her current contraception method is: oral contraceptives.  She  reports that she has quit smoking. Her smoking use included cigarettes and other. She uses smokeless tobacco.    Patient is sexually active.  STD testing offered?  Accepted  Last PHQ-9 score on record =       10/17/2023    11:16 AM   PHQ-9 SCORE   PHQ-9 Total Score 10     Last GAD7 score on record =       10/17/2023    11:16 AM   COLT-7 SCORE   Total Score 13     Alcohol Score = 3    HEALTH MAINTENANCE:  Cholesterol: (  Cholesterol   Date Value Ref Range Status   10/25/2022 168 <200 mg/dL Final      Last Mammo: Not applicable, Result: Not applicable, Next Mammo: Due at age 40   Pap: (  Lab Results   Component Value Date    GYNINTERP  10/25/2022     Low-grade squamous intraepithelial lesion (LSIL) encompassing HPV/mild dysplasia/CIN1      Colonoscopy:  N/A, Result: Not applicable, Next Colonoscopy: age 45  Dexa:  N/A    Health maintenance updated:   "yes    HISTORY:  OB History    Para Term  AB Living   0 0 0 0 0 0   SAB IAB Ectopic Multiple Live Births   0 0 0 0 0       Patient Active Problem List   Diagnosis    Family history of cervical cancer    LGSIL on Pap smear of cervix     Past Surgical History:   Procedure Laterality Date    head staples      age 4, had fishing hook in head    MOUTH SURGERY      had two front teeth pulled out age 4      Social History     Tobacco Use    Smoking status: Former     Types: Cigarettes, Other    Smokeless tobacco: Current   Substance Use Topics    Alcohol use: Yes     Comment: rarely      Problem (# of Occurrences) Relation (Name,Age of Onset)    Diabetes (2) Mother, Father    Heart Disease (1) Maternal Grandmother    Hypertension (2) Mother, Maternal Grandmother    Hyperlipidemia (3) Brother, Maternal Grandmother, Maternal Grandfather    Uterine Cancer (1) Mother    No Known Problems (4) Sister, Paternal Grandmother, Paternal Grandfather, Other              Current Outpatient Medications   Medication Sig    albuterol (PROAIR HFA/PROVENTIL HFA/VENTOLIN HFA) 108 (90 Base) MCG/ACT inhaler Inhale 1-2 puffs into the lungs    levonorgestrel-ethinyl estradiol (NORDETTE) 0.15-30 MG-MCG tablet Take 1 tablet by mouth daily Take active pills continuously    ondansetron (ZOFRAN ODT) 4 MG ODT tab Take 1 tablet (4 mg) by mouth every 8 hours as needed for nausea     No current facility-administered medications for this visit.     Allergies   Allergen Reactions    Latex Rash       Past medical, surgical, social and family histories were reviewed and updated in EPIC.    ROS:   12 point review of systems negative other than symptoms noted below or in the HPI.    EXAM:  /68   Ht 1.63 m (5' 4.17\")   Wt 89.8 kg (198 lb)   LMP 10/10/2023 (Approximate)   BMI 33.80 kg/m     BMI: Body mass index is 33.8 kg/m .    PHYSICAL EXAM:  Constitutional:   Appearance: Well nourished, well developed, alert, in no acute " distress  Neck:  Lymph Nodes:  No lymphadenopathy present    Thyroid:  Gland size normal, nontender, no nodules or masses present  on palpation  Chest:  Respiratory Effort:  Breathing unlabored  Cardiovascular:    Heart: Auscultation:  Regular rate, normal rhythm, no murmurs present  Breasts: Inspection of Breasts:  No lymphadenopathy present., Palpation of Breasts and Axillae:  No masses present on palpation, no breast tenderness., and Axillary Lymph Nodes:  No lymphadenopathy present.  Gastrointestinal:   Abdominal Examination:  Abdomen nontender to palpation, tone normal without rigidity or guarding, no masses present, umbilicus without lesions   Liver and Spleen:  No hepatomegaly present, liver nontender to palpation    Hernias:  No hernias present  Lymphatic: Lymph Nodes:  No other lymphadenopathy present  Skin:  General Inspection:  No rashes present, no lesions present, no areas of  discoloration  Neurologic:    Mental Status:  Oriented X3.  Normal strength and tone, sensory exam                grossly normal, mentation intact and speech normal.    Psychiatric:   Mentation appears normal and affect normal/bright.         Pelvic Exam:  External Genitalia:     Normal appearance for age, no discharge present, no tenderness present, no inflammatory lesions present, color normal  Vagina:     Normal vaginal vault without central or paravaginal defects, no discharge present, no inflammatory lesions present, no masses present  Bladder:     Nontender to palpation  Urethra:   Urethral Body:  Urethra palpation normal, urethra structural support normal   Urethral Meatus:  No erythema or lesions present  Cervix:     Appearance healthy, no lesions present, nontender to palpation, no bleeding present  Uterus:     Uterus: firm, normal sized and nontender, midplane in position.   Adnexa:     No adnexal tenderness present, no adnexal masses present  Perineum:     Perineum within normal limits, no evidence of trauma, no rashes  or skin lesions present  Anus:     Anus within normal limits, no hemorrhoids present  Inguinal Lymph Nodes:     No lymphadenopathy present  Pubic Hair:     Normal pubic hair distribution for age  Genitalia and Groin:     No rashes present, no lesions present, no areas of discoloration, no masses present    COUNSELING:   Reviewed preventive health counseling, as reflected in patient instructions  Special attention given to:        Regular exercise       Healthy diet/nutrition       Contraception       Safe sex practices/STD prevention    BMI: Body mass index is 33.8 kg/m .  Weight management plan: Discussed healthy diet and exercise guidelines    ASSESSMENT:  23 year old female with satisfactory annual exam.    ICD-10-CM    1. Low grade squamous intraepithelial lesion on cytologic smear of cervix (LGSIL)  R87.612 Pap thin layer screen only - recommended age 21 - 24 years      2. Screening for cervical cancer  Z12.4 Pap thin layer screen only - recommended age 21 - 24 years      3. Vaginal odor  N89.8 Multiplex Vaginal Panel by PCR      4. Poor sleep pattern  G47.8 Adult Sleep Eval & Management  Referral      5. Depression, unspecified depression type  F32.A Adult Mental Health  Referral      6. OCP (oral contraceptive pills) initiation  Z30.011 levonorgestrel-ethinyl estradiol (NORDETTE) 0.15-30 MG-MCG tablet          PLAN:  -LSIL pap history.  Repeat today. Follow-up pending results.  -Vaginal odor. MVP swab collected. Will contact patient with results.  -Poor sleep. Recommend sleep study for better evaluation.   -Elevated PHQ-Mental health referral placed.  -Refill OCP    (25 additional minutes were spent on the date of the encounter doing chart review, review of outside records, review and interpretation of pertinent test results, history and exam, documentation, patient counseling, and further activities as noted above in addition to typical time spent for preventative women's health exam.)      Depression Screening Follow-up        10/17/2023    11:16 AM   PHQ   PHQ-9 Total Score 10   Q9: Thoughts of better off dead/self-harm past 2 weeks Several days     Crisis resource information provided in the After Visit Summary  Patient to follow up with PCP.  Clinic staff to schedule appointment if able.  Mental Health Referral placed    Discussed the following ways the patient can remain in a safe environment:  remove alcohol, remove drugs, secure medications: including any narcotics, and be around others    I have reviewed the results of the [place] Screening and proposed plan of care. The patient agrees with the follow up and safety plan.      (35 minutes was spent on the date of the encounter doing chart review, review of outside records, review and interpretation of pertinent test results, history and exam, documentation, patient counseling, and further activities as noted above.)      Nida Andrews MD

## 2023-10-20 LAB
BKR LAB AP GYN ADEQUACY: ABNORMAL
BKR LAB AP GYN INTERPRETATION: ABNORMAL
BKR LAB AP HPV REFLEX: NO
BKR LAB AP LMP: ABNORMAL
BKR LAB AP PREVIOUS ABNL DX: ABNORMAL
BKR LAB AP PREVIOUS ABNORMAL: ABNORMAL
PATH REPORT.COMMENTS IMP SPEC: ABNORMAL
PATH REPORT.COMMENTS IMP SPEC: ABNORMAL
PATH REPORT.RELEVANT HX SPEC: ABNORMAL

## 2023-10-23 ENCOUNTER — PATIENT OUTREACH (OUTPATIENT)
Dept: OBGYN | Facility: CLINIC | Age: 23
End: 2023-10-23
Payer: COMMERCIAL

## 2023-11-07 ENCOUNTER — TRANSFERRED RECORDS (OUTPATIENT)
Dept: HEALTH INFORMATION MANAGEMENT | Facility: CLINIC | Age: 23
End: 2023-11-07
Payer: COMMERCIAL

## 2023-11-13 DIAGNOSIS — R11.0 NAUSEA: ICD-10-CM

## 2023-11-13 DIAGNOSIS — R10.13 EPIGASTRIC PAIN: ICD-10-CM

## 2023-12-07 ENCOUNTER — TRANSFERRED RECORDS (OUTPATIENT)
Dept: HEALTH INFORMATION MANAGEMENT | Facility: CLINIC | Age: 23
End: 2023-12-07
Payer: COMMERCIAL

## 2023-12-14 ENCOUNTER — MYC REFILL (OUTPATIENT)
Dept: OBGYN | Facility: CLINIC | Age: 23
End: 2023-12-14
Payer: COMMERCIAL

## 2023-12-14 ENCOUNTER — TRANSFERRED RECORDS (OUTPATIENT)
Dept: HEALTH INFORMATION MANAGEMENT | Facility: CLINIC | Age: 23
End: 2023-12-14
Payer: COMMERCIAL

## 2023-12-14 DIAGNOSIS — Z30.011 OCP (ORAL CONTRACEPTIVE PILLS) INITIATION: ICD-10-CM

## 2023-12-15 ENCOUNTER — TELEPHONE (OUTPATIENT)
Dept: OBGYN | Facility: CLINIC | Age: 23
End: 2023-12-15
Payer: COMMERCIAL

## 2023-12-15 RX ORDER — LEVONORGESTREL AND ETHINYL ESTRADIOL 0.15-0.03
1 KIT ORAL DAILY
Qty: 112 TABLET | Refills: 3 | OUTPATIENT
Start: 2023-12-15

## 2023-12-15 NOTE — TELEPHONE ENCOUNTER
"Requested Prescriptions   Pending Prescriptions Disp Refills    levonorgestrel-ethinyl estradiol (NORDETTE) 0.15-30 MG-MCG tablet 112 tablet 3     Sig: Take 1 tablet by mouth daily Take active pills continuously       Contraceptives Protocol Passed - 12/14/2023  8:36 PM        Passed - Patient is not a current smoker if age is 35 or older        Passed - Recent (12 mo) or future (30 days) visit within the authorizing provider's specialty     Patient has had an office visit with the authorizing provider or a provider within the authorizing providers department within the previous 12 mos or has a future within next 30 days. See \"Patient Info\" tab in inbasket, or \"Choose Columns\" in Meds & Orders section of the refill encounter.              Passed - Medication is active on med list        Passed - No active pregnancy on record        Passed - No positive pregnancy test in past 12 months           Refills available  Eileen Dewey RN on 12/15/2023 at 8:33 AM    "

## 2023-12-15 NOTE — TELEPHONE ENCOUNTER
Called pt and informed the Rx was sent for the full year to [facility] and from here she just notifies [facility] to prepare the refill for .    Pt verbalized understanding, in agreement with plan, and voiced no further questions.    Karey Muñoz RN on 12/15/2023 at 8:51 AM    
[facility] Call Center    Phone Message    May a detailed message be left on voicemail: yes     Reason for Call: Medication Refill Request    Has the patient contacted the pharmacy for the refill? Yes   Name of medication being requested: levonorgestrel-ethinyl estradiol (NORDETTE) 0.15-30 MG-MCG tablet       Provider who prescribed the medication: Nida Andrews  Pharmacy:     [id number] - [place], [place] - [address]    Date medication is needed: Asap    Patient got her last fill on 10/17/23 with 3 refills. States she is currently out of her script and is currently on her menstrual cycle. Script was denied when she requested a refill. Please contact patient in regards to this message. Thank you    Action Taken: Other: Women's    Travel Screening: Not Applicable                                                                   
sitting

## 2024-03-05 NOTE — PROGRESS NOTES
SUBJECTIVE:                                                   Reanna Crawford is a 23 year old female who presents to clinic today for the following health issue(s):  Patient presents with:  Vaginal Problem: CC:  painful bump on left inner labia near clitoris      HPI:  Reanna is here today for vaginal pain that started 3/5/24 morning. Feels like her left labia minora is swollen and painful. She has tried ibuprofen, but this has not helped the pain. Nothing has improved it. Worse with sitting, walking, touching. Denies discharge, bleeding, itching, rashes, spreading, N/V, fevers, or chills. Does state she had intercourse the night prior to this happening.     Patient's last menstrual period was 2024..     Patient is sexually active, .  Using oral contraceptives for contraception.    reports that she has quit smoking. Her smoking use included cigarettes and other. She uses smokeless tobacco.    STD testing offered?  Accepted    Health maintenance updated:  yes    Overdue          Never done ASTHMA ACTION PLAN (Yearly)     Never done Pneumococcal Vaccine: Pediatrics (0 to 5 Years) and At-Risk Patients (6 to 64 Years) (1 of 2 - PCV)     Never done HEPATITIS B IMMUNIZATION (1 of 3 - 19+ 3-dose series)     2022 DTAP/TDAP/TD IMMUNIZATION (2 - Td or Tdap)  Last completed: Oct 25, 2022     Never done COVID-19 Vaccine ( season)     2024 PHQ-2 (once per calendar year) (Yearly, January to December)  Last completed: Oct 17, 2023       Today's PHQ-2 Score:       3/6/2024     1:01 PM   PHQ-2 (  [employer])   Q1: Little interest or pleasure in doing things 1   Q2: Feeling down, depressed or hopeless 1   PHQ-2 Score 2     Today's PHQ-9 Score:       3/6/2024     1:01 PM   PHQ-9 SCORE   PHQ-9 Total Score 8     Today's COLT-7 Score:       3/6/2024     1:01 PM   COLT-7 SCORE   Total Score 11       Problem list and histories reviewed & adjusted, as indicated.  Additional history: as  "documented.    Patient Active Problem List   Diagnosis    Family history of cervical cancer    LGSIL on Pap smear of cervix     Past Surgical History:   Procedure Laterality Date    head staples      age 4, had fishing hook in head    MOUTH SURGERY      had two front teeth pulled out age 4      Social History     Tobacco Use    Smoking status: Former     Types: Cigarettes, Other    Smokeless tobacco: Current   Substance Use Topics    Alcohol use: Yes     Comment: rarely      Problem (# of Occurrences) Relation (Name,Age of Onset)    Diabetes (2) Mother, Father    Heart Disease (1) Maternal Grandmother    Hypertension (2) Mother, Maternal Grandmother    Hyperlipidemia (3) Brother, Maternal Grandmother, Maternal Grandfather    Uterine Cancer (1) Mother    No Known Problems (4) Sister, Paternal Grandmother, Paternal Grandfather, Other              Current Outpatient Medications   Medication Sig    albuterol (PROAIR HFA/PROVENTIL HFA/VENTOLIN HFA) 108 (90 Base) MCG/ACT inhaler Inhale 1-2 puffs into the lungs    levonorgestrel-ethinyl estradiol (NORDETTE) 0.15-30 MG-MCG tablet Take 1 tablet by mouth daily Take active pills continuously    omeprazole (PRILOSEC) 20 MG DR capsule TAKE 1 CAPSULE BY MOUTH DAILY    ondansetron (ZOFRAN ODT) 4 MG ODT tab Take 1 tablet (4 mg) by mouth every 8 hours as needed for nausea     Current Facility-Administered Medications   Medication    lidocaine (XYLOCAINE) 2 % external gel     Allergies   Allergen Reactions    Latex Rash       OBJECTIVE:     /62 (BP Location: Right arm)   Ht 1.626 m (5' 4\")   Wt 90.7 kg (200 lb)   LMP 02/05/2024   BMI 34.33 kg/m    Body mass index is 34.33 kg/m .    Exam:  Constitutional:  Appearance: Well nourished, well developed alert, in no acute distress  Neurologic:  Mental Status:  Oriented X3.  Normal strength and tone, sensory exam grossly normal, mentation intact and speech normal.       Labia minora: swollen on left side, no erythema, no signs of " infection.     In-Clinic Test Results:  No results found for this or any previous visit (from the past 24 hour(s)).    ASSESSMENT/PLAN:                                                        ICD-10-CM    1. Swelling of vagina  N89.9 lidocaine (XYLOCAINE) 2 % external gel      2. Screening for chlamydial disease  Z11.8       3. Screening for STD (sexually transmitted disease)  Z11.3 NEISSERIA GONORRHOEA PCR     CHLAMYDIA TRACHOMATIS PCR     Treponema Abs w Reflex to RPR and Titer     HIV Antigen Antibody Combo     Hepatitis B Surface  Antigen     Hepatitis C Antibody     Treponema Abs w Reflex to RPR and Titer     HIV Antigen Antibody Combo     Hepatitis B Surface  Antigen     Hepatitis C Antibody      4. Encounter for hepatitis C screening test for low risk patient  Z11.59       5. Screening for HIV (human immunodeficiency virus)  Z11.4           There are no Patient Instructions on file for this visit.    Labia Minora localized Swelling  -swelling and tenderness x D#2  -following intercourse  -denies any other symptoms, no discharge or bleeding  -no known injury to tissue  -Recommend: pelvic rest, lidocaine jelly to the area, alternate tylenol and ibuprofen, ice packs  -Return if symptoms are worsening or if you develop redness, hardening, discharge, fevers, chills, N/V      TOM Benavides [facility] [place]

## 2024-03-06 ENCOUNTER — OFFICE VISIT (OUTPATIENT)
Dept: OBGYN | Facility: CLINIC | Age: 24
End: 2024-03-06
Payer: COMMERCIAL

## 2024-03-06 VITALS
WEIGHT: 200 LBS | DIASTOLIC BLOOD PRESSURE: 62 MMHG | SYSTOLIC BLOOD PRESSURE: 122 MMHG | BODY MASS INDEX: 34.15 KG/M2 | HEIGHT: 64 IN

## 2024-03-06 DIAGNOSIS — N89.9 SWELLING OF VAGINA: Primary | ICD-10-CM

## 2024-03-06 DIAGNOSIS — Z11.8 SCREENING FOR CHLAMYDIAL DISEASE: ICD-10-CM

## 2024-03-06 DIAGNOSIS — Z11.4 SCREENING FOR HIV (HUMAN IMMUNODEFICIENCY VIRUS): ICD-10-CM

## 2024-03-06 DIAGNOSIS — Z11.59 ENCOUNTER FOR HEPATITIS C SCREENING TEST FOR LOW RISK PATIENT: ICD-10-CM

## 2024-03-06 DIAGNOSIS — Z11.3 SCREENING FOR STD (SEXUALLY TRANSMITTED DISEASE): ICD-10-CM

## 2024-03-06 LAB — T PALLIDUM AB SER QL: NONREACTIVE

## 2024-03-06 PROCEDURE — 87340 HEPATITIS B SURFACE AG IA: CPT

## 2024-03-06 PROCEDURE — 99213 OFFICE O/P EST LOW 20 MIN: CPT

## 2024-03-06 PROCEDURE — 86803 HEPATITIS C AB TEST: CPT

## 2024-03-06 PROCEDURE — 87389 HIV-1 AG W/HIV-1&-2 AB AG IA: CPT

## 2024-03-06 PROCEDURE — 36415 COLL VENOUS BLD VENIPUNCTURE: CPT

## 2024-03-06 PROCEDURE — 87491 CHLMYD TRACH DNA AMP PROBE: CPT

## 2024-03-06 PROCEDURE — 86780 TREPONEMA PALLIDUM: CPT

## 2024-03-06 RX ORDER — LIDOCAINE HYDROCHLORIDE 20 MG/ML
JELLY TOPICAL ONCE
Status: COMPLETED | OUTPATIENT
Start: 2024-03-06 | End: 2024-03-06

## 2024-03-06 RX ADMIN — LIDOCAINE HYDROCHLORIDE: 20 JELLY TOPICAL at 13:40

## 2024-03-06 ASSESSMENT — ANXIETY QUESTIONNAIRES
1. FEELING NERVOUS, ANXIOUS, OR ON EDGE: MORE THAN HALF THE DAYS
7. FEELING AFRAID AS IF SOMETHING AWFUL MIGHT HAPPEN: SEVERAL DAYS
3. WORRYING TOO MUCH ABOUT DIFFERENT THINGS: MORE THAN HALF THE DAYS
GAD7 TOTAL SCORE: 11
2. NOT BEING ABLE TO STOP OR CONTROL WORRYING: MORE THAN HALF THE DAYS
5. BEING SO RESTLESS THAT IT IS HARD TO SIT STILL: MORE THAN HALF THE DAYS
GAD7 TOTAL SCORE: 11
IF YOU CHECKED OFF ANY PROBLEMS ON THIS QUESTIONNAIRE, HOW DIFFICULT HAVE THESE PROBLEMS MADE IT FOR YOU TO DO YOUR WORK, TAKE CARE OF THINGS AT HOME, OR GET ALONG WITH OTHER PEOPLE: SOMEWHAT DIFFICULT
6. BECOMING EASILY ANNOYED OR IRRITABLE: NOT AT ALL

## 2024-03-06 ASSESSMENT — PATIENT HEALTH QUESTIONNAIRE - PHQ9
5. POOR APPETITE OR OVEREATING: MORE THAN HALF THE DAYS
SUM OF ALL RESPONSES TO PHQ QUESTIONS 1-9: 8

## 2024-03-07 ENCOUNTER — HOSPITAL ENCOUNTER (EMERGENCY)
Facility: CLINIC | Age: 24
Discharge: HOME | End: 2024-03-07
Attending: EMERGENCY MEDICINE | Admitting: EMERGENCY MEDICINE
Payer: COMMERCIAL

## 2024-03-07 VITALS
HEART RATE: 88 BPM | SYSTOLIC BLOOD PRESSURE: 138 MMHG | HEIGHT: 64 IN | TEMPERATURE: 98 F | DIASTOLIC BLOOD PRESSURE: 88 MMHG | OXYGEN SATURATION: 99 % | RESPIRATION RATE: 16 BRPM | WEIGHT: 200 LBS | BODY MASS INDEX: 34.15 KG/M2

## 2024-03-07 DIAGNOSIS — N89.8 VAGINAL IRRITATION: ICD-10-CM

## 2024-03-07 DIAGNOSIS — L03.818 CELLULITIS OF OTHER SPECIFIED SITE: ICD-10-CM

## 2024-03-07 LAB
C TRACH DNA SPEC QL NAA+PROBE: NEGATIVE
HBV SURFACE AG SERPL QL IA: NONREACTIVE
HCV AB SERPL QL IA: NONREACTIVE
HIV 1+2 AB+HIV1 P24 AG SERPL QL IA: NONREACTIVE

## 2024-03-07 PROCEDURE — 99284 EMERGENCY DEPT VISIT MOD MDM: CPT

## 2024-03-07 RX ORDER — SULFAMETHOXAZOLE/TRIMETHOPRIM 800-160 MG
1 TABLET ORAL 2 TIMES DAILY
Qty: 14 TABLET | Refills: 0 | Status: SHIPPED | OUTPATIENT
Start: 2024-03-07 | End: 2024-03-14

## 2024-03-07 RX ORDER — KETOROLAC TROMETHAMINE 10 MG/1
10 TABLET, FILM COATED ORAL EVERY 6 HOURS PRN
Qty: 12 TABLET | Refills: 0 | Status: SHIPPED | OUTPATIENT
Start: 2024-03-07

## 2024-03-07 RX ORDER — CEPHALEXIN 500 MG/1
500 CAPSULE ORAL 4 TIMES DAILY
Qty: 28 CAPSULE | Refills: 0 | Status: SHIPPED | OUTPATIENT
Start: 2024-03-07 | End: 2024-03-14

## 2024-03-07 ASSESSMENT — ACTIVITIES OF DAILY LIVING (ADL)
ADLS_ACUITY_SCORE: 33
ADLS_ACUITY_SCORE: 33

## 2024-03-07 NOTE — ED PROVIDER NOTES
"  History     Chief Complaint:  Vaginal Problem     The history is provided by the patient.      Reanna Crawford is a 23 year old female with a history of chlamydia who presents to the emergency department for vaginal problem. The patient states that 1 week ago, she shaved her vaginal region and then had sexual intercourse. She reports that 2 days after, she began experiencing left labia swelling and tenderness. She notes that she was seen by her OB/GYN yesterday. Denies drainage. Denies fever, nausea, vomiting, diarrhea. Denies dysuria. She is on birth control.    Independent Historian:   None - Patient Only    Review of External Notes:   I reviewed the patient's OB/GYN office visit from yesterday.     Medications:   Albuterol  Levonorgestrel-ethinyl estradiol  Omeprazole  Ondansetron    Past Medical History:    Anxiety  Asthma  Depression  High cholesterol  Chlamydia  Lyme disease    Past Surgical History:    Head staples  Mouth surgery     Physical Exam   Patient Vitals for the past 24 hrs:   BP Temp Temp src Pulse Resp SpO2 Height Weight   03/07/24 0739 138/88 98  F (36.7  C) Oral 88 16 99 % 1.626 m (5' 4\") 90.7 kg (200 lb)      Physical Exam  Constitutional: Vital signs reviewed.  Pleasant.  HEENT: Moist mucous membranes  Cardiovascular: Regular rate and rhythm  Pulmonary/Chest: Breathing comfortably on room air.  No audible wheezing  Musculoskeletal/Extremities: No bony deformities.  Moves all 4 extremities without difficulty.  : Swelling left labia majora.  Mild surrounding erythema, no abscess or drainage.  Neurological: Alert.  No focal deficits.  Endo: No pitting edema  Skin: No visible rash.  Psychiatric: Pleasant.    Emergency Department Course     Emergency Department Course & Assessments:    Interventions:  None     Assessments:  0904 I obtained history and examined the patient as noted above. I discussed findings and discharge with the patient. All questions answered.     Independent Interpretation " (X-rays, CTs, rhythm strip):  None    Consultations/Discussion of Management or Tests:  None     Social Determinants of Health affecting care:   None    Disposition:  The patient was discharged.     Impression & Plan      Medical Decision Making:  Patient presents emergency department concerns over pain to the left labia majora.  She saw her OB yesterday who did STD testing and gave her lidocaine gel.  She states that symptoms are worsening and she feels like the swelling is increased.  I do not see any obvious drainable abscess here today.  There is some swelling with mild irritation and erythema.  She does not have any dysuria or vaginal discharge.  Again she was already tested for chlamydia gonorrhea syphilis HIV and hepatitis yesterday some these results are back and negative some are still pending.  I do not see any drainable abscess here today.  I have discussed starting her on antibiotics to treat for possible cellulitis.  I will give her Keflex and Bactrim to cover for possible early abscess as well.  I will also give her some Toradol for pain control.  She will continue to use the gel as well as sitz bath's.  Follow-up as needed    Diagnosis:    ICD-10-CM    1. Vaginal irritation  N89.8       2. Cellulitis of other specified site  L03.818          Discharge Medications:  New Prescriptions    CEPHALEXIN (KEFLEX) 500 MG CAPSULE    Take 1 capsule (500 mg) by mouth 4 times daily for 7 days    KETOROLAC (TORADOL) 10 MG TABLET    Take 1 tablet (10 mg) by mouth every 6 hours as needed for moderate pain    SULFAMETHOXAZOLE-TRIMETHOPRIM (BACTRIM DS) 800-160 MG TABLET    Take 1 tablet by mouth 2 times daily for 7 days      Scribe Disclosure:  Yuriy ZAMBRANO, am serving as a scribe at 9:10 AM on 3/7/2024 to document services personally performed by Antelmo Ribeiro MD based on my observations and the provider's statements to me.     3/7/2024   Antelmo Ribeiro MD Walters, Brent Aaron, MD  03/07/24  0482

## 2024-03-07 NOTE — ED TRIAGE NOTES
Pt has her left labia swollen and tender   Pt noticed 3 days ago   Pt had recently shaved area and sex prior on Saturday then had issue noticed on Sunday   [facility]

## 2024-03-26 ENCOUNTER — MYC REFILL (OUTPATIENT)
Dept: OBGYN | Facility: CLINIC | Age: 24
End: 2024-03-26
Payer: COMMERCIAL

## 2024-03-26 DIAGNOSIS — Z30.011 OCP (ORAL CONTRACEPTIVE PILLS) INITIATION: ICD-10-CM

## 2024-03-27 RX ORDER — LEVONORGESTREL AND ETHINYL ESTRADIOL 0.15-0.03
1 KIT ORAL DAILY
Qty: 112 TABLET | Refills: 3 | OUTPATIENT
Start: 2024-03-27

## 2024-03-27 NOTE — TELEPHONE ENCOUNTER
"Requested Prescriptions   Pending Prescriptions Disp Refills    levonorgestrel-ethinyl estradiol (NORDETTE) 0.15-30 MG-MCG tablet 112 tablet 3     Sig: Take 1 tablet by mouth daily Take active pills continuously       Contraceptives Protocol Passed - 3/26/2024  8:32 PM        Passed - Patient is not a current smoker if age is 35 or older        Passed - Recent (12 mo) or future (30 days) visit within the authorizing provider's specialty     The patient must have completed an in-person or virtual visit within the past 12 months or has a future visit scheduled within the next 90 days with the authorizing provider s specialty.  Urgent care and e-visits do not quality as an office visit for this protocol.          Passed - Medication is active on med list        Passed - No active pregnancy on record        Passed - No positive pregnancy test in past 12 months         Last Written Prescription Date:  10/17/23  Last Fill Quantity: 112,  # refills: 3   Last office visit: 3/6/2024 ; last virtual visit: Visit date not found with prescribing provider:  Nida Andrews   Future Office Visit:  10/22/24 with Nida Andrews    Refill request refused due to :   Refills available at pharmacy.  Request sent back to pharmacy as \"duplicate\"    [employer] message sent to patient to speak to pharmacy about the need for early refill.    Mignon Regan RN on 3/27/2024 at 6:25 AM              "

## 2024-05-20 ENCOUNTER — OFFICE VISIT (OUTPATIENT)
Dept: OBGYN | Facility: CLINIC | Age: 24
End: 2024-05-20
Payer: COMMERCIAL

## 2024-05-20 VITALS
HEIGHT: 64 IN | DIASTOLIC BLOOD PRESSURE: 62 MMHG | WEIGHT: 195 LBS | SYSTOLIC BLOOD PRESSURE: 120 MMHG | BODY MASS INDEX: 33.29 KG/M2

## 2024-05-20 DIAGNOSIS — N89.8 ITCHING OF VAGINA: Primary | ICD-10-CM

## 2024-05-20 DIAGNOSIS — B37.31 YEAST INFECTION INVOLVING THE VAGINA AND SURROUNDING AREA: ICD-10-CM

## 2024-05-20 LAB
BACTERIAL VAGINOSIS VAG-IMP: NEGATIVE
CANDIDA DNA VAG QL NAA+PROBE: DETECTED
CANDIDA GLABRATA / CANDIDA KRUSEI DNA: NOT DETECTED
T VAGINALIS DNA VAG QL NAA+PROBE: NOT DETECTED

## 2024-05-20 PROCEDURE — 0352U MULTIPLEX VAGINAL PANEL BY PCR: CPT

## 2024-05-20 PROCEDURE — 99213 OFFICE O/P EST LOW 20 MIN: CPT

## 2024-05-20 NOTE — PROGRESS NOTES
SUBJECTIVE:                                                   Reanna Crawford is a 24 year old female who presents to clinic today for the following health issue(s):  Patient presents with:  Vaginal Problem: Internal vaginal itching for 2 days.    HPI:  Rigoberto presents with vaginal itching x 2 days. She denies changes in discharge, urinary symptoms, pelvic pain/pressure. Denies fever/chills, body aches, open lesions. Has not used any new products/creams/detergents in vulvovaginal area.     She uses continuous OCPs for contraception. Is sexually active with one male partner, monogamous.    Patient's last menstrual period was 2024..     Patient is sexually active, .  Using oral contraceptives for contraception.    reports that she has quit smoking. Her smoking use included cigarettes and other. She uses smokeless tobacco.    STD testing offered?  Declined    Health maintenance updated:  yes    Overdue          Never done ASTHMA ACTION PLAN (Yearly)     Never done Pneumococcal Vaccine: Pediatrics (0 to 5 Years) and At-Risk Patients (6 to 64 Years) (1 of 2 - PCV)     Never done HEPATITIS B IMMUNIZATION (1 of 3 - 19+ 3-dose series)     2022 DTAP/TDAP/TD IMMUNIZATION (2 - Td or Tdap)  Last completed: Oct 25, 2022     Never done COVID-19 Vaccine ( - - season)     MAR 12  2024 ASTHMA CONTROL TEST (Every 6 Months)  Last completed: Sep 12, 2023       Today's PHQ-2 Score:       3/6/2024     1:01 PM   PHQ-2 (  [employer])   Q1: Little interest or pleasure in doing things 1   Q2: Feeling down, depressed or hopeless 1   PHQ-2 Score 2     Today's PHQ-9 Score:       3/6/2024     1:01 PM   PHQ-9 SCORE   PHQ-9 Total Score 8     Today's COLT-7 Score:       3/6/2024     1:01 PM   COLT-7 SCORE   Total Score 11       Problem list and histories reviewed & adjusted, as indicated.  Additional history: as documented.    Patient Active Problem List   Diagnosis    Family history of cervical cancer    LGSIL on Pap  "smear of cervix     Past Surgical History:   Procedure Laterality Date    head staples      age 4, had fishing hook in head    MOUTH SURGERY      had two front teeth pulled out age 4      Social History     Tobacco Use    Smoking status: Former     Types: Cigarettes, Other    Smokeless tobacco: Current   Substance Use Topics    Alcohol use: Yes     Comment: rarely      Problem (# of Occurrences) Relation (Name,Age of Onset)    Diabetes (2) Mother, Father    Heart Disease (1) Maternal Grandmother    Hypertension (2) Mother, Maternal Grandmother    Hyperlipidemia (3) Brother, Maternal Grandmother, Maternal Grandfather    Uterine Cancer (1) Mother    No Known Problems (4) Sister, Paternal Grandmother, Paternal Grandfather, Other              Current Outpatient Medications   Medication Sig Dispense Refill    albuterol (PROAIR HFA/PROVENTIL HFA/VENTOLIN HFA) 108 (90 Base) MCG/ACT inhaler Inhale 1-2 puffs into the lungs      ketorolac (TORADOL) 10 MG tablet Take 1 tablet (10 mg) by mouth every 6 hours as needed for moderate pain 12 tablet 0    levonorgestrel-ethinyl estradiol (NORDETTE) 0.15-30 MG-MCG tablet Take 1 tablet by mouth daily Take active pills continuously 112 tablet 3    omeprazole (PRILOSEC) 20 MG DR capsule TAKE 1 CAPSULE BY MOUTH DAILY 30 capsule 0    ondansetron (ZOFRAN ODT) 4 MG ODT tab Take 1 tablet (4 mg) by mouth every 8 hours as needed for nausea 15 tablet 1     No current facility-administered medications for this visit.     Allergies   Allergen Reactions    Latex Rash     OBJECTIVE:     /62 (BP Location: Right arm)   Ht 1.626 m (5' 4\")   Wt 88.5 kg (195 lb)   LMP 04/08/2024   BMI 33.47 kg/m    Body mass index is 33.47 kg/m .    Exam:  Constitutional:  Appearance: Well nourished, well developed alert, in no acute distress  Neurologic:  Mental Status:  Oriented X3.  Normal strength and tone, sensory exam grossly normal, mentation intact and speech normal.    Psychiatric:  Mentation appears " normal and affect normal/bright.  Pelvic Exam:  External Genitalia:     Normal appearance for age, no discharge present, no tenderness present, no inflammatory lesions present, color normal  Vagina:     Normal vaginal vault without central or paravaginal defects, scant discharge present, no inflammatory lesions present, no masses present  Bladder:     Nontender to palpation  Urethra:   Urethral Body:  Urethra palpation normal, urethra structural support normal   Urethral Meatus:  No erythema or lesions present  Cervix:     Appearance healthy, no lesions present, nontender to palpation, no bleeding present  Uterus:     Uterus: firm, normal sized and nontender  Adnexa:     No adnexal tenderness present, no adnexal masses present  Perineum:     Perineum within normal limits, no evidence of trauma, no rashes or skin lesions present  Anus:     Anus within normal limits, no hemorrhoids present  Inguinal Lymph Nodes:     No lymphadenopathy present  Pubic Hair:     Normal pubic hair distribution for age  Genitalia and Groin:     No rashes present, no lesions present, no areas of discoloration, no masses present     In-Clinic Test Results:  No results found for this or any previous visit (from the past 24 hour(s)).    ASSESSMENT/PLAN:                                                        ICD-10-CM    1. Itching of vagina  N89.8 Multiplex Vaginal Panel by PCR     Multiplex Vaginal Panel by PCR          Patient Instructions   Thank you for visiting the [facility].    The vulva is the external genital area of females. The skin of the vulva can be quite sensitive. Because the area is moist and frequently subjected to friction while sitting and moving, this area can be easily injured.     There are various strategies that can be used to prevent irritation and allow the vulva to heal.      Keeping this area dry can accelerate healing.  Chemicals found in toilet tissues, laundry soaps and detergents that  come in contact with the vulva can cause irritation.  Avoiding contact with potential irritants that contain chemicals is important.  Fabric softeners in undergarments, chemicals in deodorant soaps, bubble baths, feminine hygiene spray and panty liners, etc., can all cause irritation to the vulva.      The following recommendations are specific measures that can help minimize vulvar irritation:    - Wear white 100% cotton underwear and do not wear underwear at night.  Do not wear pantyhose, tights, or other close-fitting clothes.  Enclosing this area with synthetic fibers holds both heat and moisture in the skin, conditions which potentiate the development of infections.  Tight-fitting clothes may also increase your symptoms of discomfort.    - After washing underwear, put it through at least one whole cycle with water only.  Some women have suffered needlessly from irritants in detergents whose residue was left in clothes by incomplete rinsing.  Rinsing clothes thoroughly is more important than which detergent is used although to be on the safe side, the milder the soap, the better.  Wash new underwear before wearing.  Fabric softeners and dryer sheets should not be used.    - Rinse skin off with plain water frequently.  Use tap water, distilled water, sitz baths, squirt bottles, or bidets.  Additionally, hand held showers can be helpful for rinsing the vulva.  After rinsing, pat the skin gently dry.    - Use very mild soap for bathing.  It is best not to use any soaps on the vulva.  The vulva should be rinsed with warm water.  Bars of soap such as Neutrogena unscented face soap, Dove, Basis, Pears (made in [place]), and castile soap with olive oil (El) are gentle to the other skin areas.  They are found at pharmacies or health food stores.  Remember that frequent baths with soaps may increase the irritation.  You cannot wash away your symptoms.    - Some patients find the use of cool gel packs on the vulva to  be helpful.    - Don't sit or remain in a wet bathing suit for prolonged periods. Change underwear shortly after exercising as well.    Please do not hesitate to contact the office if you have any questions or concerns.    Patient presents for vaginal itching x 2 days.     Vaginal Itching  - discussed possible causes of vaginal itching including friction irritation, infection, and vulvar hygiene    - STI testing offered, declined  - MVP to test for BV/yeast  - if negative for infection, consider vaginal moisturizer, vaseline barrier ointment for protection of healing tissues     TOM Ge [facility] [place]

## 2024-05-20 NOTE — PATIENT INSTRUCTIONS
Thank you for visiting the [facility].    The vulva is the external genital area of females. The skin of the vulva can be quite sensitive. Because the area is moist and frequently subjected to friction while sitting and moving, this area can be easily injured.     There are various strategies that can be used to prevent irritation and allow the vulva to heal.      Keeping this area dry can accelerate healing.  Chemicals found in toilet tissues, laundry soaps and detergents that come in contact with the vulva can cause irritation.  Avoiding contact with potential irritants that contain chemicals is important.  Fabric softeners in undergarments, chemicals in deodorant soaps, bubble baths, feminine hygiene spray and panty liners, etc., can all cause irritation to the vulva.      The following recommendations are specific measures that can help minimize vulvar irritation:    - Wear white 100% cotton underwear and do not wear underwear at night.  Do not wear pantyhose, tights, or other close-fitting clothes.  Enclosing this area with synthetic fibers holds both heat and moisture in the skin, conditions which potentiate the development of infections.  Tight-fitting clothes may also increase your symptoms of discomfort.    - After washing underwear, put it through at least one whole cycle with water only.  Some women have suffered needlessly from irritants in detergents whose residue was left in clothes by incomplete rinsing.  Rinsing clothes thoroughly is more important than which detergent is used although to be on the safe side, the milder the soap, the better.  Wash new underwear before wearing.  Fabric softeners and dryer sheets should not be used.    - Rinse skin off with plain water frequently.  Use tap water, distilled water, sitz baths, squirt bottles, or bidets.  Additionally, hand held showers can be helpful for rinsing the vulva.  After rinsing, pat the skin gently dry.    - Use very  mild soap for bathing.  It is best not to use any soaps on the vulva.  The vulva should be rinsed with warm water.  Bars of soap such as Neutrogena unscented face soap, Dove, Basis, Pears (made in [place]), and castile soap with olive oil (El) are gentle to the other skin areas.  They are found at pharmacies or health food stores.  Remember that frequent baths with soaps may increase the irritation.  You cannot wash away your symptoms.    - Some patients find the use of cool gel packs on the vulva to be helpful.    - Don't sit or remain in a wet bathing suit for prolonged periods. Change underwear shortly after exercising as well.    Please do not hesitate to contact the office if you have any questions or concerns.

## 2024-05-21 RX ORDER — FLUCONAZOLE 150 MG/1
150 TABLET ORAL
Qty: 2 TABLET | Refills: 0 | Status: SHIPPED | OUTPATIENT
Start: 2024-05-21

## 2024-06-13 ENCOUNTER — TELEPHONE (OUTPATIENT)
Dept: OBGYN | Facility: CLINIC | Age: 24
End: 2024-06-13
Payer: COMMERCIAL

## 2024-06-13 DIAGNOSIS — Z78.9 USES BIRTH CONTROL: ICD-10-CM

## 2024-06-13 DIAGNOSIS — N93.9 ABNORMAL UTERINE BLEEDING: Primary | ICD-10-CM

## 2024-06-13 NOTE — TELEPHONE ENCOUNTER
"\"[9:18 AM] Darryl Nida MIREYA  I would say US, but would have her wait one more week to see if bleeding stops, this is likely just because she stopped for the 4 days.    Can cancel appointment if bleeding stops\"    RN called pt and discussed AUB on continuous use OCP. Could still work itself out d/t being off pills for 4 days. Continue use and monitor. Schedule US and visit but can cancel if bleeding stops.  RN encouraged pt to take home UPT to r/o pregnancy r/t bleeding since stopped for 4 days. Pt will do this.    Transferred to scheduling.    Pt verbalized understanding, in agreement with plan, and voiced no further questions.    Karey Muñoz RN on 6/13/2024 at 9:26 AM        "

## 2024-06-13 NOTE — TELEPHONE ENCOUNTER
"LMP 4/8/24 reported at 5/20/24 visit    levonorgestrel-ethinyl estradiol (NORDETTE) 0.15-30 MG-MCG tablet   Continuous use/skips placebo  Never has had issues or bleeding this long during continuous use  Good pill taker    Did loose her pack of pills on week 4 and had a 4 day of \"break\" which she  started bleeding last week  6/7 got her new pack and started at that time.  Continues to bleed dark red and sometimes brighter blood  Currently medium flow and consistently every time she urinates sees dark blood  Wearing tampon or pad - one or the other and they are sometimes saturated and changes 4-5 times a day but doesn't like sitting in them so changes more often than has to.  Cramping during day only  Currently on week 1 of pack, day 5    RN routing to Dr Andrews as primary GYN to advise next steps. Has had a \"4day\" break of continuous use and still bleeding into week 1 of new pack. Next steps?    Karey Muñoz RN on 6/13/2024 at 9:05 AM        "

## 2024-06-13 NOTE — TELEPHONE ENCOUNTER
[facility]    Phone Message    May a detailed message be left on voicemail: yes     Reason for Call: Symptoms or Concerns     If patient has red-flag symptoms, warm transfer to triage line    Current symptom or concern: pt is calling stating she has had her period for 3 weeks now, it's been pretty heavy, with dark blood, please call pt to discuss, thank you    Symptoms have been present for:  3 week(s)    Has patient previously been seen for this? No        Are there any new or worsening symptoms? Yes: not improving     Action Taken: Message routed to:  Other: obgyn    Travel Screening: Not Applicable     Date of Service:

## 2024-07-14 ASSESSMENT — ASTHMA QUESTIONNAIRES
QUESTION_3 LAST FOUR WEEKS HOW OFTEN DID YOUR ASTHMA SYMPTOMS (WHEEZING, COUGHING, SHORTNESS OF BREATH, CHEST TIGHTNESS OR PAIN) WAKE YOU UP AT NIGHT OR EARLIER THAN USUAL IN THE MORNING: NOT AT ALL
QUESTION_1 LAST FOUR WEEKS HOW MUCH OF THE TIME DID YOUR ASTHMA KEEP YOU FROM GETTING AS MUCH DONE AT WORK, SCHOOL OR AT HOME: A LITTLE OF THE TIME
QUESTION_5 LAST FOUR WEEKS HOW WOULD YOU RATE YOUR ASTHMA CONTROL: WELL CONTROLLED
ACT_TOTALSCORE: 20
ACT_TOTALSCORE: 20
QUESTION_2 LAST FOUR WEEKS HOW OFTEN HAVE YOU HAD SHORTNESS OF BREATH: ONCE OR TWICE A WEEK
QUESTION_4 LAST FOUR WEEKS HOW OFTEN HAVE YOU USED YOUR RESCUE INHALER OR NEBULIZER MEDICATION (SUCH AS ALBUTEROL): TWO OR THREE TIMES PER WEEK

## 2024-07-15 ENCOUNTER — VIRTUAL VISIT (OUTPATIENT)
Dept: FAMILY MEDICINE | Facility: CLINIC | Age: 24
End: 2024-07-15
Payer: COMMERCIAL

## 2024-07-15 DIAGNOSIS — F41.0 ANXIETY ATTACK: Primary | ICD-10-CM

## 2024-07-15 DIAGNOSIS — F41.1 GAD (GENERALIZED ANXIETY DISORDER): ICD-10-CM

## 2024-07-15 PROCEDURE — 99213 OFFICE O/P EST LOW 20 MIN: CPT | Mod: 95 | Performed by: INTERNAL MEDICINE

## 2024-07-15 RX ORDER — ESCITALOPRAM OXALATE 5 MG/1
5 TABLET ORAL DAILY
Qty: 90 TABLET | Refills: 3 | Status: SHIPPED | OUTPATIENT
Start: 2024-07-15

## 2024-07-15 NOTE — PROGRESS NOTES
Reanna is a 24 year old who is being evaluated via a billable video visit.    How would you like to obtain your AVS? MyChart  If the video visit is dropped, the invitation should be resent by: Text to cell phone: [phone]  Will anyone else be joining your video visit? No      Subjective   Reanna is a 24 year old, presenting for the following health issues:  Nausea      Video Start Time: 1:35 PM       History of Present Illness       Reason for visit: Poorly controlled anxiety, panic attacks    She eats 0-1 servings of fruits and vegetables daily.She consumes 4 sweetened beverage(s) daily.She exercises with enough effort to increase her heart rate 60 or more minutes per day.  She exercises with enough effort to increase her heart rate 7 days per week.   She is taking medications regularly.        Current Medications:     Current Outpatient Medications   Medication Sig Dispense Refill    albuterol (PROAIR HFA/PROVENTIL HFA/VENTOLIN HFA) 108 (90 Base) MCG/ACT inhaler Inhale 1-2 puffs into the lungs      escitalopram (LEXAPRO) 5 MG tablet Take 1 tablet (5 mg) by mouth daily 90 tablet 3    fluconazole (DIFLUCAN) 150 MG tablet Take 1 tablet (150 mg) by mouth every 72 hours 2 tablet 0    ketorolac (TORADOL) 10 MG tablet Take 1 tablet (10 mg) by mouth every 6 hours as needed for moderate pain 12 tablet 0    levonorgestrel-ethinyl estradiol (NORDETTE) 0.15-30 MG-MCG tablet Take 1 tablet by mouth daily Take active pills continuously 112 tablet 3    omeprazole (PRILOSEC) 20 MG DR capsule TAKE 1 CAPSULE BY MOUTH DAILY 30 capsule 0    ondansetron (ZOFRAN ODT) 4 MG ODT tab Take 1 tablet (4 mg) by mouth every 8 hours as needed for nausea 15 tablet 1         Allergies:      Allergies   Allergen Reactions    Latex Rash            Past Medical History:     Past Medical History:   Diagnosis Date    Anxiety     Asthma     Depression     High cholesterol     History of chlamydia     LGSIL on Pap smear of cervix 10/25/2022    rpt 1  year per PCP    Lyme disease     Uses oral contraception          Past Surgical History:     Past Surgical History:   Procedure Laterality Date    head staples      age 4, had fishing hook in head    MOUTH SURGERY      had two front teeth pulled out age 4         Family Medical History:     Family History   Problem Relation Age of Onset    Uterine Cancer Mother     Hypertension Mother     Diabetes Mother     Diabetes Father     No Known Problems Sister     Hyperlipidemia Brother     Hyperlipidemia Maternal Grandmother     Hypertension Maternal Grandmother     Heart Disease Maternal Grandmother     Hyperlipidemia Maternal Grandfather     No Known Problems Paternal Grandmother     No Known Problems Paternal Grandfather     No Known Problems Other          Social History:     Social History     Socioeconomic History    Marital status: Single     Spouse name: Not on file    Number of children: 0    Years of education: Not on file    Highest education level: Not on file   Occupational History    Occupation:  at [employer]   Tobacco Use    Smoking status: Former     Types: Cigarettes, Other    Smokeless tobacco: Current   Vaping Use    Vaping status: Every Day    Substances: Nicotine, Flavoring    Devices: Pre-filled or refillable cartridge   Substance and Sexual Activity    Alcohol use: Yes     Comment: rarely    Drug use: Yes     Types: Marijuana     Comment: daily    Sexual activity: Yes     Partners: Male     Birth control/protection: Pill   Other Topics Concern    Not on file   Social History Narrative    Not on file     Social Determinants of Health     Financial Resource Strain: Not on file   Food Insecurity: Not on file   Transportation Needs: Not on file   Physical Activity: Not on file   Stress: Not on file   Social Connections: Not on file   Interpersonal Safety: Not on file   Housing Stability: Not on file           Review of System:     Constitutional: Negative for fever or chills  Skin:  Negative for rashes  Ears/Nose/Throat: Negative for nasal congestion, sore throat  Respiratory: No shortness of breath, dyspnea on exertion, cough, or hemoptysis  Cardiovascular: Negative for chest pain  Gastrointestinal: positive for nausea  Genitourinary: Negative for dysuria, hematuria  Musculoskeletal: Negative for myalgias  Neurologic: Negative for headaches  Psychiatric: positive for depression, anxiety  Hematologic/Lymphatic/Immunologic: Negative  Endocrine: Negative  Behavioral: Negative for tobacco use       Physical Exam:   [facility] 04/08/2024     GENERAL: alert and no distress  EYES: eyes grossly normal to inspection  HENT: Normocephalic atraumatic.   NECK: supple  RESP: no cough present   NEURO: Alert & Oriented x 3.   PSYCH: mentation appears normal, anxious affect         Diagnostic Test Results:     Diagnostic Test Results:  Labs reviewed in Epic    ASSESSMENT/PLAN:       Reanna was seen today for nausea.    Diagnoses and all orders for this visit:    Anxiety attack  -  symptoms not well controlled  -   escitalopram (LEXAPRO) 5 MG tablet; Take 1 tablet (5 mg) by mouth daily  -     Adult Mental Health  Referral; Future            Follow Up Plan:     Patient is instructed to return to Internal Medicine clinic for follow-up visit in 1 month.        Mayte Painter MD  Internal Medicine  [facility]    Video-Visit Details    Type of service:  Video Visit   Video End Time: 1:46 pm  Originating Location (pt. Location): Home    Distant Location (provider location):  Off-site  Platform used for Video Visit: Reece  Signed Electronically by: Mayte Painter MD

## 2024-07-23 NOTE — PATIENT INSTRUCTIONS
Thank you for visiting the [facility].    Please do not hesitate to contact the office if you have any questions or concerns.

## 2024-07-24 ENCOUNTER — ANCILLARY PROCEDURE (OUTPATIENT)
Dept: ULTRASOUND IMAGING | Facility: CLINIC | Age: 24
End: 2024-07-24
Payer: COMMERCIAL

## 2024-07-24 ENCOUNTER — OFFICE VISIT (OUTPATIENT)
Dept: OBGYN | Facility: CLINIC | Age: 24
End: 2024-07-24
Payer: COMMERCIAL

## 2024-07-24 VITALS
WEIGHT: 193 LBS | SYSTOLIC BLOOD PRESSURE: 114 MMHG | HEIGHT: 64 IN | BODY MASS INDEX: 32.95 KG/M2 | DIASTOLIC BLOOD PRESSURE: 72 MMHG

## 2024-07-24 DIAGNOSIS — N93.9 ABNORMAL UTERINE BLEEDING: Primary | ICD-10-CM

## 2024-07-24 DIAGNOSIS — N93.9 ABNORMAL UTERINE BLEEDING: ICD-10-CM

## 2024-07-24 PROCEDURE — 99213 OFFICE O/P EST LOW 20 MIN: CPT

## 2024-07-24 PROCEDURE — 76830 TRANSVAGINAL US NON-OB: CPT | Performed by: OBSTETRICS & GYNECOLOGY

## 2024-07-24 NOTE — Clinical Note
Can we have Reanna schedule a phone visit to discuss her ultrasound? I could see her today if she has availability!

## 2024-07-24 NOTE — PROGRESS NOTES
SUBJECTIVE:                                                   Reanna Crawford is a 24 year old female who presents to clinic today for the following health issue(s):  Patient presents with:  Contraception    HPI:  Reanna presents to discuss intermenstrual spotting while on continuous OCPs x 3 months. She reports that bleeding began mid-April when she missed a few days of her birth control pills. She had 2 weeks of heavy bleeding and then since that time has had daily spotting. She has been on OCPs for many years, had never had intermenstrual bleeding before.     She has trialed taking 1 week break from OCPs for menses every other month, but has continued to have daily spotting despite this change.     She denies change in diet or activity, pelvic pain/pressure, urinary symptoms, vaginal itching/odor/discharge.     No LMP recorded. (Menstrual status: Birth Control)..   Patient is sexually active, .  Using oral contraceptives for contraception. Takes continuously for cycle regulation.    reports that she has quit smoking. Her smoking use included cigarettes and other. She uses smokeless tobacco.  STD testing offered?  Declined  Health maintenance reviewed.     Today's PHQ-2 Score:       2024     2:46 PM   PHQ-2 (  [employer])   Q1: Little interest or pleasure in doing things 0   Q2: Feeling down, depressed or hopeless 0   PHQ-2 Score 0     Today's PHQ-9 Score:       3/6/2024     1:01 PM   PHQ-9 SCORE   PHQ-9 Total Score 8     Today's COLT-7 Score:       3/6/2024     1:01 PM   COLT-7 SCORE   Total Score 11       Problem list and histories reviewed & adjusted, as indicated.  Additional history: as documented.    Patient Active Problem List   Diagnosis    Family history of cervical cancer    LGSIL on Pap smear of cervix     Past Surgical History:   Procedure Laterality Date    head staples      age 4, had fishing hook in head    MOUTH SURGERY      had two front teeth pulled out age 4      Social History  "    Tobacco Use    Smoking status: Former     Types: Cigarettes, Other    Smokeless tobacco: Current   Substance Use Topics    Alcohol use: Yes     Comment: rarely      Problem (# of Occurrences) Relation (Name,Age of Onset)    Diabetes (2) Mother, Father    Heart Disease (1) Maternal Grandmother    Hypertension (2) Mother, Maternal Grandmother    Hyperlipidemia (3) Brother, Maternal Grandmother, Maternal Grandfather    Uterine Cancer (1) Mother    No Known Problems (4) Sister, Paternal Grandmother, Paternal Grandfather, Other              Current Outpatient Medications   Medication Sig Dispense Refill    albuterol (PROAIR HFA/PROVENTIL HFA/VENTOLIN HFA) 108 (90 Base) MCG/ACT inhaler Inhale 1-2 puffs into the lungs      escitalopram (LEXAPRO) 5 MG tablet Take 1 tablet (5 mg) by mouth daily 90 tablet 3    fluconazole (DIFLUCAN) 150 MG tablet Take 1 tablet (150 mg) by mouth every 72 hours 2 tablet 0    ketorolac (TORADOL) 10 MG tablet Take 1 tablet (10 mg) by mouth every 6 hours as needed for moderate pain 12 tablet 0    levonorgestrel-ethinyl estradiol (NORDETTE) 0.15-30 MG-MCG tablet Take 1 tablet by mouth daily Take active pills continuously 112 tablet 3    omeprazole (PRILOSEC) 20 MG DR capsule TAKE 1 CAPSULE BY MOUTH DAILY 30 capsule 0    ondansetron (ZOFRAN ODT) 4 MG ODT tab Take 1 tablet (4 mg) by mouth every 8 hours as needed for nausea 15 tablet 1     No current facility-administered medications for this visit.     Allergies   Allergen Reactions    Latex Rash     OBJECTIVE:     /72   Ht 1.626 m (5' 4\")   Wt 87.5 kg (193 lb)   BMI 33.13 kg/m    Body mass index is 33.13 kg/m .    Exam:  Constitutional:  Appearance: Well nourished, well developed alert, in no acute distress  Neurologic:  Mental Status:  Oriented X3.  Normal strength and tone, sensory exam grossly normal, mentation intact and speech normal.    Psychiatric:  Mentation appears normal and affect normal/bright.     In-Clinic Test " Results:  No results found for this or any previous visit (from the past 24 hour(s)).    ASSESSMENT/PLAN:                                                        ICD-10-CM    1. Abnormal uterine bleeding  N93.9 US Transvaginal Pelvic Non-OB          Patient Instructions   Thank you for visiting the [facility].    Please do not hesitate to contact the office if you have any questions or concerns.     Rigoberto presents for intermenstrual bleeding while on continuous OCPs x 3 months.     Abnormal Uterine Bleeding  - discussed physiological changes in teens/early 20s that can affect menstrual cycles  - ordered TVUS to evaluate for any structural changes, opening later today, will call patient to discuss results  - if no structural changes, (Cryselle / Lo-Ovral) discussed options to trial change in OCP formulation to option with lower breakthrough bleeding   - also discussed alternate option for contraception if no improvement with change in OCPs    Vivien Osorio PA-C  [facility]

## 2024-07-25 ENCOUNTER — VIRTUAL VISIT (OUTPATIENT)
Dept: OBGYN | Facility: CLINIC | Age: 24
End: 2024-07-25
Payer: COMMERCIAL

## 2024-07-25 DIAGNOSIS — Z30.09 BIRTH CONTROL COUNSELING: Primary | ICD-10-CM

## 2024-07-25 PROCEDURE — 99207 PR NO BILLABLE SERVICE THIS VISIT: CPT | Mod: 93

## 2024-07-25 NOTE — PATIENT INSTRUCTIONS
Thank you for visiting the [facility].    Please do not hesitate to contact the office if you have any questions or concerns.

## 2024-07-25 NOTE — PROGRESS NOTES
Reanna Crawford is a 24 year old female who is being evaluated via a patient initiated billable telephone visit.     What phone number would you like to be contacted at? [phone]   How would you like to obtain your AVS? MyChart    Originating Location (pt. Location): home      Distant Location (provider location):  On-site      SUBJECTIVE:                                                   Reanna Crawford is a 24 year old female who presents for virtual visit today for the following health issue(s):  Patient presents with:  Ultrasound    HPI:  Reanna presents for phone visit follow up to ultrasound complete yesterday.     She has had new intermenstrual bleeding while using continuous OCPs over the last 3 months.     Discussed ultrasound results - all normal, no abnormalities that would results in abnormal bleeding.     Discussed options to change OCP formulation, alternate birth control  methods if breakthrough bleeding continues / is bothersome to patient.    Results for orders placed or performed in visit on 24   US Transvaginal Pelvic Non-OB    Narrative    Gynecological Ultrasound Report  Pelvic U/S - Transvaginal  [facility]  Referring Provider: Vivien Osorio PA-C   Sonographer:  Sol Mcmahon RDMS  Indication: Bleeding/Menses- Abnormal uterine bleeding (AUB)  LMP: No LMP recorded. (Menstrual status: Birth Control).  :   Gynecological Ultrasonography:   Uterus: anteverted. Contour is smooth/regular.  Size: 5.92 x 3.80 x 2.94 cm  Endometrium: Thickness Total 1.95 mm  Findings: Normal  Right Ovary: 2.26 x 1.78 x 1.28 cm. Wnl  Left Ovary: 2.29 x 1.56 x 1.36 cm. Wnl  Cul de Sac Free Fluid: No free fluid  Technique: Transvaginal Imaging performed     Impression:      Normal pelvic ultrasound.     Kerri Heard Masters, DO          No LMP recorded. (Menstrual status: Birth Control).    Patient is sexually active, .  Using oral contraceptives for contraception.     reports that she has quit smoking. Her smoking use included cigarettes and other. She uses smokeless tobacco.    Health maintenance updated:  yes    Today's PHQ-2 Score:       7/14/2024     2:46 PM   PHQ-2 ( 1999 [employer])   Q1: Little interest or pleasure in doing things 0   Q2: Feeling down, depressed or hopeless 0   PHQ-2 Score 0     Today's PHQ-9 Score:       3/6/2024     1:01 PM   PHQ-9 SCORE   PHQ-9 Total Score 8     Today's COLT-7 Score:       3/6/2024     1:01 PM   COLT-7 SCORE   Total Score 11       Problem list and histories reviewed & adjusted, as indicated.  Additional history: as documented.    Patient Active Problem List   Diagnosis    Family history of cervical cancer    LGSIL on Pap smear of cervix     Past Surgical History:   Procedure Laterality Date    head staples      age 4, had fishing hook in head    MOUTH SURGERY      had two front teeth pulled out age 4      Social History     Tobacco Use    Smoking status: Former     Types: Cigarettes, Other    Smokeless tobacco: Current   Substance Use Topics    Alcohol use: Yes     Comment: rarely      Problem (# of Occurrences) Relation (Name,Age of Onset)    Diabetes (2) Mother, Father    Heart Disease (1) Maternal Grandmother    Hypertension (2) Mother, Maternal Grandmother    Hyperlipidemia (3) Brother, Maternal Grandmother, Maternal Grandfather    Uterine Cancer (1) Mother    No Known Problems (4) Sister, Paternal Grandmother, Paternal Grandfather, Other              Current Outpatient Medications   Medication Sig Dispense Refill    albuterol (PROAIR HFA/PROVENTIL HFA/VENTOLIN HFA) 108 (90 Base) MCG/ACT inhaler Inhale 1-2 puffs into the lungs      escitalopram (LEXAPRO) 5 MG tablet Take 1 tablet (5 mg) by mouth daily 90 tablet 3    fluconazole (DIFLUCAN) 150 MG tablet Take 1 tablet (150 mg) by mouth every 72 hours 2 tablet 0    ketorolac (TORADOL) 10 MG tablet Take 1 tablet (10 mg) by mouth every 6 hours as needed for moderate pain 12 tablet 0     levonorgestrel-ethinyl estradiol (NORDETTE) 0.15-30 MG-MCG tablet Take 1 tablet by mouth daily Take active pills continuously 112 tablet 3    omeprazole (PRILOSEC) 20 MG DR capsule TAKE 1 CAPSULE BY MOUTH DAILY 30 capsule 0    ondansetron (ZOFRAN ODT) 4 MG ODT tab Take 1 tablet (4 mg) by mouth every 8 hours as needed for nausea 15 tablet 1     No current facility-administered medications for this visit.     Allergies   Allergen Reactions    Latex Rash     OBJECTIVE:     No vitals were obtained today due to virtual telephone visit.    Physical Exam  GENERAL: alert and no distress  RESP: No audible wheeze, cough, or visible cyanosis.    NEURO: Cranial nerves grossly intact.  Mentation and speech appropriate for age.  PSYCH: Appropriate affect, tone, and pace of words    ASSESSMENT/PLAN:                                                      Phone call duration: 10 minutes    No diagnosis found.    Patient Instructions   Thank you for visiting the [facility].    Please do not hesitate to contact the office if you have any questions or concerns.     Reanna presents for ultrasound follow up for intermenstrual bleeding, normal results.     Will adjust OCP formulation to option with lower breakthrough bleeding. Rx placed today for Lo/Ovral.     Also discussed other options for birth control if breakthrough bleeding continues, patient interested in Mirena IUD if needed.     Vivien Osorio PA-C  [facility]

## 2024-09-30 ENCOUNTER — PATIENT OUTREACH (OUTPATIENT)
Dept: OBGYN | Facility: CLINIC | Age: 24
End: 2024-09-30
Payer: COMMERCIAL

## 2024-09-30 ENCOUNTER — MYC MEDICAL ADVICE (OUTPATIENT)
Dept: OBGYN | Facility: CLINIC | Age: 24
End: 2024-09-30
Payer: COMMERCIAL

## 2024-09-30 DIAGNOSIS — Z30.09 BIRTH CONTROL COUNSELING: ICD-10-CM

## 2024-09-30 PROBLEM — R87.612 LGSIL ON PAP SMEAR OF CERVIX: Status: ACTIVE | Noted: 2022-11-01

## 2024-09-30 NOTE — TELEPHONE ENCOUNTER
Requested Prescriptions   Pending Prescriptions Disp Refills    norgestrel-ethinyl estradiol (LO/OVRAL) 0.3-30 MG-MCG tablet 112 tablet 3     Sig: Take 1 tablet by mouth daily.       There is no refill protocol information for this order        Pt requesting 4 packs for refill due to sometimes skipping period.  OK to state take continuously? If so please adjust    7/25/24: Tenisha  She has had new intermenstrual bleeding while using continuous OCPs over the last 3 months.      Discussed ultrasound results - all normal, no abnormalities that would results in abnormal bleeding.     Will adjust OCP formulation to option with lower breakthrough bleeding. Rx placed today for Lo/Ovral.      Also discussed other options for birth control if breakthrough bleeding continues, patient interested in Mirena IUD if needed.     Routing pt Change[employer] message to provider to advise.  David Calloway RN on 9/30/2024 at 12:30 PM   [facility]

## 2024-09-30 NOTE — TELEPHONE ENCOUNTER
Patient due for Pap.    Reminder done today via [employer].    10/23/24 visit scheduled- notes added

## 2024-10-01 ENCOUNTER — TRANSFERRED RECORDS (OUTPATIENT)
Dept: HEALTH INFORMATION MANAGEMENT | Facility: CLINIC | Age: 24
End: 2024-10-01

## 2024-10-14 NOTE — PROGRESS NOTES
Reanna is a 24 year old  female who presents for annual exam.     Besides routine health maintenance, would like to talk about her birth control pill.    HPI:  Reanna is a 25 yo female with PMHx anxiety, asthma who presents for preventive gynecologic exam.     She was last seen by myself in 2024 for breakthrough bleeding with the use of continuous oral contraceptives. Switched to Lo-Ovral for better BTB profile. Continued breakthrough bleeding during first month after change in formulation but now is completely resolved. She has noticed some new onset vaginal dryness following change which has resulted in some dyspareunia. She is not currently using any personal lubricants, no pelvic pain, no vaginal irritation/discharge. She is wondering about IUD in the future.     Asthma and mental health managed by primary care. No concerns with current medications, stable with Albuterol and Lexapro 5 mg.     Declines STI testing today, monogamous relationship.     Care gaps showing due for Tdap. Last completed  - will look into Tdap in childhood as she is unsure if completed.     No other questions or concerns.     GYNECOLOGIC HISTORY:    No LMP recorded. (Menstrual status: Birth Control).  Regular menses? No, taking continuous pill  Length of menses: 5-6 days  Her current contraception method is: oral contraceptives.  She  reports that she has quit smoking. Her smoking use included cigarettes and other. She uses smokeless tobacco.  Patient is sexually active.  STD testing offered?  Declined    Last PHQ-9 score on record =       3/6/2024     1:01 PM   PHQ-9 SCORE   PHQ-9 Total Score 8     Last GAD7 score on record =       3/6/2024     1:01 PM   COLT-7 SCORE   Total Score 11       HEALTH MAINTENANCE:  Cholesterol: followed by primary care provider  Cholesterol   Date Value Ref Range Status   10/25/2022 168 <200 mg/dL Final     Mammo: Due at age 40.  Pap:   Lab Results   Component Value Date    GYNINTERP   10/17/2023     Atypical squamous cells of undetermined significance (ASC-US)    GYNINTERP  10/25/2022     Low-grade squamous intraepithelial lesion (LSIL) encompassing HPV/mild dysplasia/CIN1     Colonoscopy: Due at age 45.  Dexa:  Due at age 65.    Health maintenance reviewed: Immunizations reviewed.       Overdue          Never done ASTHMA ACTION PLAN (Yearly)     Never done Pneumococcal Vaccine: Pediatrics (0 to 5 Years) and At-Risk Patients (6 to 64 Years) (1 of 2 - PCV)     Never done HEPATITIS B IMMUNIZATION (1 of 3 - 19+ 3-dose series)     2022 DTAP/TDAP/TD IMMUNIZATION (2 - Td or Tdap)  Last completed: Oct 25, 2022     SEP 1  2024 INFLUENZA VACCINE (1)  Last completed: Sep 12, 2023     Never done COVID-19 Vaccine ( -  season)         Due Soon          OCT 17  2024 YEARLY PREVENTIVE VISIT (Yearly)  Last completed: Oct 17, 2023     OCT 17  2024 PAP FOLLOW-UP (Once)  Last completed: Oct 17, 2023           HISTORY:  OB History    Para Term  AB Living   0 0 0 0 0 0   SAB IAB Ectopic Multiple Live Births   0 0 0 0 0       Patient Active Problem List   Diagnosis    Family history of cervical cancer    LGSIL on Pap smear of cervix     Past Surgical History:   Procedure Laterality Date    head staples      age 4, had fishing hook in head    MOUTH SURGERY      had two front teeth pulled out age 4      Social History     Tobacco Use    Smoking status: Former     Types: Cigarettes, Other    Smokeless tobacco: Current   Substance Use Topics    Alcohol use: Yes     Comment: rarely      Problem (# of Occurrences) Relation (Name,Age of Onset)    Diabetes (2) Mother, Father    Heart Disease (1) Maternal Grandmother    Hypertension (2) Mother, Maternal Grandmother    Hyperlipidemia (3) Brother, Maternal Grandmother, Maternal Grandfather    Uterine Cancer (1) Mother    No Known Problems (4) Sister, Paternal Grandmother, Paternal Grandfather, Other              Current Outpatient Medications  "  Medication Sig Dispense Refill    albuterol (PROAIR HFA/PROVENTIL HFA/VENTOLIN HFA) 108 (90 Base) MCG/ACT inhaler Inhale 1-2 puffs into the lungs      escitalopram (LEXAPRO) 5 MG tablet Take 1 tablet (5 mg) by mouth daily 90 tablet 3    norgestrel-ethinyl estradiol (LO/OVRAL) 0.3-30 MG-MCG tablet Take 1 tablet by mouth daily. 112 tablet 3    omeprazole (PRILOSEC) 20 MG DR capsule TAKE 1 CAPSULE BY MOUTH DAILY 30 capsule 0    ondansetron (ZOFRAN ODT) 4 MG ODT tab Take 1 tablet (4 mg) by mouth every 8 hours as needed for nausea 15 tablet 1     No current facility-administered medications for this visit.     Allergies   Allergen Reactions    Latex Rash       Past medical, surgical, social and family histories were reviewed and updated in Psychiatric.    EXAM:  /64   Ht 1.613 m (5' 3.5\")   Wt 99.3 kg (219 lb)   BMI 38.19 kg/m     BMI: Body mass index is 38.19 kg/m .    PHYSICAL EXAM:  Constitutional:   Appearance: Well nourished, well developed, alert, in no acute distress  Neck:  Lymph Nodes:  No lymphadenopathy present    Thyroid:  Gland size normal, nontender, no nodules or masses present  on palpation  Chest:  Respiratory Effort:  Breathing unlabored  Cardiovascular:    Heart: Auscultation:  Regular rate, normal rhythm, no murmurs present  Breasts: Deferred. Discussed self breast exams per patient preference  Gastrointestinal:   Abdominal Examination:  Abdomen nontender to palpation, tone normal without rigidity or guarding, no masses present, umbilicus without lesions   Liver and Spleen:  No hepatomegaly present, liver nontender to palpation    Hernias:  No hernias present  Lymphatic: Lymph Nodes:  No other lymphadenopathy present  Skin:  General Inspection:  No rashes present, no lesions present, no areas of  discoloration  Neurologic:    Mental Status:  Oriented X3.  Normal strength and tone, sensory exam                grossly normal, mentation intact and speech normal.    Psychiatric:   Mentation appears " normal and affect normal/bright.         Pelvic Exam:  External Genitalia:     Normal appearance for age, no discharge present, no tenderness present, no inflammatory lesions present, color normal  Vagina:     Normal vaginal vault without central or paravaginal defects, no discharge present, no inflammatory lesions present, no masses present  Bladder:     Nontender to palpation  Urethra:   Urethral Body:  Urethra palpation normal, urethra structural support normal   Urethral Meatus:  No erythema or lesions present  Cervix:     Appearance healthy, no lesions present, nontender to palpation, no bleeding present  Uterus:     Uterus: firm, normal sized and nontender  Adnexa:     No adnexal tenderness present, no adnexal masses present  Perineum:     Perineum within normal limits, no evidence of trauma, no rashes or skin lesions present  Anus:     Anus within normal limits, no hemorrhoids present  Inguinal Lymph Nodes:     No lymphadenopathy present  Pubic Hair:     Normal pubic hair distribution for age  Genitalia and Groin:     No rashes present, no lesions present, no areas of discoloration, no masses present    COUNSELING:   Reviewed preventive health counseling, as reflected in patient instructions       Contraception       Safe sex practices/STD prevention    BMI: Body mass index is 38.19 kg/m .  Weight management plan: Patient was referred to their PCP to discuss a diet and exercise plan.    ASSESSMENT:  24 year old female with satisfactory annual exam.    ICD-10-CM    1. Encounter for gynecological examination without abnormal finding  Z01.419           PLAN:  Reanna is a 23 yo female with PMHx anxiety, asthma who presents for preventive gynecologic exam.     Health Maintenance  - PAP updated today, last done 10/2023 ([facility]) . Counseled on ASCCP guidelines, results of testing via PAP team when completed.   - Counseled on self breast exams  - Labs completed with PCP. Results and recommendation via MyChart  - STI  screening declined  - counseled on eligibility for flu, COVID vaccines and patient will look into TDAP childhood vaccines  - Provided information about Honoring Choices advance health care planning    Vaginal Dryness / Dyspareunia  - new onset since change in birth control formulation   - discussed use of personal lubricant and vaginal moisturizers, handouts given   - patient possibly interested in IUD in the future, discussed Paragard vs Mirena, insertion process, risks/benefits, effectiveness, potential side effects and changes in menses - patient will call or message office if interested in switching her method of contraception     Tdap Series  - patient will check childhood vaccination records  - if has not previously completed, should complete additional 2 doses of vaccines series    Patient can return in 1 year for annual exam, sooner with concerns.     Vivien Osorio PA-C

## 2024-10-23 ENCOUNTER — OFFICE VISIT (OUTPATIENT)
Dept: OBGYN | Facility: CLINIC | Age: 24
End: 2024-10-23
Payer: COMMERCIAL

## 2024-10-23 VITALS
WEIGHT: 219 LBS | BODY MASS INDEX: 37.39 KG/M2 | HEIGHT: 64 IN | DIASTOLIC BLOOD PRESSURE: 64 MMHG | SYSTOLIC BLOOD PRESSURE: 114 MMHG

## 2024-10-23 DIAGNOSIS — Z01.419 ENCOUNTER FOR GYNECOLOGICAL EXAMINATION WITHOUT ABNORMAL FINDING: Primary | ICD-10-CM

## 2024-10-23 DIAGNOSIS — Z12.4 SCREENING FOR CERVICAL CANCER: ICD-10-CM

## 2024-10-23 PROCEDURE — G0145 SCR C/V CYTO,THINLAYER,RESCR: HCPCS

## 2024-10-23 PROCEDURE — 99459 PELVIC EXAMINATION: CPT

## 2024-10-23 PROCEDURE — G0124 SCREEN C/V THIN LAYER BY MD: HCPCS

## 2024-10-23 PROCEDURE — 99395 PREV VISIT EST AGE 18-39: CPT

## 2024-10-23 NOTE — PATIENT INSTRUCTIONS
Thank you for visiting the [facility].    Today we discussed tetanus vaccination. You should check for childhood vaccination records of this vaccine. The CDC recommends a five-dose series of the DTaP vaccine for children under seven years old to protect against tetanus: The five doses are given at 2, 4, and 6 months, 15-18 months, and 4-6 years.    If you have not completed these vaccinations, you should complete a 3 dose adult series. Feel free to reach out to our clinic or primary care with any questions!    We care about your health! Here are some general tips to help you stay as healthy as possible:    - What we eat and drink provides the nutrients we need to keep our bodies working well. Try to eat a variety of foods including lots of vegetables, fruits, whole grains, and proteins. Try to limit foods and beverages with high sodium, trans fats, added sugars, and alcohol.    - Physical activity is beneficial for both your physical and mental health. Aim for at least 2.5 hours of medium-intensity (walking, biking, yoga, housework, etc) or 1.25 hours of high-intensity (running, jump rope, rowing, etc) aerobic physical activity per week in addition to engaging in strengthening activities at least 2 times per week. Start small and work towards a goal. Any activity is good activity!    - If you are not preventing pregnancy, take a folic acid supplement of 0.4 mg/day.    - Calcium helps build and maintain strong bones, muscles, and nerves. Daily total calcium intake (food + supplements) should be 1000mg (equal to 3-4 servings of calcium rich foods such as milk, cheese, yogurt, seafood, leafy green vegetables, edamame, orange juice with added calcium, and some cereals, among others)    - STI testing is always available to you. Please contact the office whenever you would like to update your testing or have a new or potential exposure.    - Follow sexually transmitted infection (STI) prevention  tips: talk to all partners about STI testing, use condoms or other barrier methods to protect yourself and others, and get tested for STIs with each new partner.    - Return for a preventative visit every year    ADVANCE DIRECTIVE  We recommend that everyone make an advance directive about their health care goals and update it every 5 years.    When it comes to decisions about your health, it s important that your health care goals, values and wishes are known. In the event of a sudden injury or illness, you may not be able to communicate your choices. We encourage you to begin by thinking about what matters most to you. Have conversations with family, friends, cultural and/or jennifer leaders, and your health care team. Then, share your goals and wishes for current and future healthcare so your voice is heard when treatment decisions need to be made. See the link below for help with talking about and sharing what is most important to you:     [email or web address]      Vaginal Lubricants  Vaginal lubricants reduce discomfort with sexual activity when the vagina is dry by decreasing friction.  All lubricants are not the same!  Some lubricants have added ingredients that can change the pH of your vagina and increase your risk of infection.   Those listed below have a low osmolality, are pH balanced, and are preservative free.   You can also buy lubricants in drug stores or online at: http://www.Donde; http://www.[employer]    Type   Characteristics      Brand names  Water-based lubricants - Easy to find, low-cost, safe to use with latex condoms, do not stain fabric  - Dry out quickly, often sticky or tacky.   - Avoid synthetic glycerin, parabens, and glycol which can disrupt the good vaginal bacteria, cause micro-abrasions in the sensitive vaginal/anal mucosa, and may cause increased risk for transmission of herpes and other bacteria and viruses. Read  the ingredients. Avoid flavored or  warming  or  tingling  which may be irritating.  Good Clean Love Naked  Desert [place] Aloe Mulino  Sliquid  Slippery Stuff  System Jovita  Pre-Seed  Yes Sperm friendly     Silicone-based lubricants -Silicone-based lubricants are safe to use with: condoms, diaphragms, and rubber dams.  -Do not use silicone-based lubricant with other products that are made of silicone, such as: sex toys, vibrators, dilators, and vaginal pessaries.   -Lasts longer than water based but more expensive  -Needs soap and water to wash off outside of body PINK silicone  Aloe-ahh  Sliquid Organics Silk  Wet Platinum  überlube     Natural oils -Do not use any oils with condoms. Oils will break down the latex in many condoms. A damaged condom will make it more likely for you to get pregnant or an infection from your partner.  -Inexpensive  -Stains fabrics  -Never use petroleum jelly (such as Vaseline ) as a lubricant, never use hand or body lotions in the vagina [place] oil  Coconut oil   Olive oil  Yes! Oil-based  Vitamin E oil     Vaginal Moisturizers   Vaginal moisturizers line the wall of the vagina and maintain vaginal moisture. Moisturizers are used 2-3 times/week.     Moisturizers may not provide enough comfort during intercourse, so personal lubricants may still be needed.     One of the downsides of vaginal moisturizers is that they are messy. Because the vagina absorbs what it needs and excretes the rest, you may want to wear a panty-liner. If you wear panty liners or pads, you may want to use a skin protectant cream such as Aquaphor , Balmex , or Desitin .     You can buy lubricants in drug stores or online at: http://www.Comparameglio.it; http://www.DermaGen    Type   Characteristics     Brand Names  Hydrating types   -If you also use vaginal estrogen, do NOT use it on the same nights you use these moisturizers.  -Often use applicators for insertion or come as a vaginal suppository   -Usually inserted  in the vagina up to 3 times weekly Yes Vaginal Moisturizer  Good Clean Love Restore  HYALO GYN  Revaree  Replens  Long-Lasting Vaginal Moisturizer   Luvena (has probiotics if you have recurrent vaginal infections)  Desert [place] Aloe Conner     Soothing (natural oil) types   Not for use with condoms Extra Virgin Olive Oil  Vitamin E oil (open capsule and place oil in the vagina)  [place] Huber-E Suppositories  Coconut Oil   Vulvar Moisturizer For dry vulvar tissues   V Magic     Please do not hesitate to contact the office if you have any questions or concerns.

## 2024-10-31 LAB
BKR LAB AP GYN ADEQUACY: ABNORMAL
BKR LAB AP GYN INTERPRETATION: ABNORMAL
BKR LAB AP HPV REFLEX: NO
BKR LAB AP PREVIOUS ABNL DX: ABNORMAL
BKR LAB AP PREVIOUS ABNORMAL: ABNORMAL
PATH REPORT.COMMENTS IMP SPEC: ABNORMAL
PATH REPORT.COMMENTS IMP SPEC: ABNORMAL
PATH REPORT.RELEVANT HX SPEC: ABNORMAL

## 2024-11-01 ENCOUNTER — PATIENT OUTREACH (OUTPATIENT)
Dept: OBGYN | Facility: CLINIC | Age: 24
End: 2024-11-01
Payer: COMMERCIAL

## 2024-11-21 DIAGNOSIS — Z01.812 PRE-PROCEDURE LAB EXAM: Primary | ICD-10-CM

## 2025-01-20 ENCOUNTER — OFFICE VISIT (OUTPATIENT)
Dept: OBGYN | Facility: CLINIC | Age: 25
End: 2025-01-20

## 2025-01-20 ENCOUNTER — LAB (OUTPATIENT)
Dept: LAB | Facility: CLINIC | Age: 25
End: 2025-01-20

## 2025-01-20 VITALS — DIASTOLIC BLOOD PRESSURE: 78 MMHG | WEIGHT: 235 LBS | SYSTOLIC BLOOD PRESSURE: 106 MMHG | BODY MASS INDEX: 40.98 KG/M2

## 2025-01-20 DIAGNOSIS — Z01.812 PRE-PROCEDURE LAB EXAM: ICD-10-CM

## 2025-01-20 DIAGNOSIS — R87.610 ASCUS OF CERVIX WITH NEGATIVE HIGH RISK HPV: Primary | ICD-10-CM

## 2025-01-20 LAB — HCG UR QL: NEGATIVE

## 2025-01-20 PROCEDURE — 81025 URINE PREGNANCY TEST: CPT

## 2025-01-20 PROCEDURE — 88342 IMHCHEM/IMCYTCHM 1ST ANTB: CPT | Performed by: PATHOLOGY

## 2025-01-20 PROCEDURE — 57455 BIOPSY OF CERVIX W/SCOPE: CPT | Performed by: OBSTETRICS & GYNECOLOGY

## 2025-01-20 PROCEDURE — 88305 TISSUE EXAM BY PATHOLOGIST: CPT | Performed by: PATHOLOGY

## 2025-01-20 NOTE — PROGRESS NOTES
INDICATIONS:                                                    Is a pregnancy test required: Yes.  Was it positive or negative?  Negative  Was a consent obtained?  Yes    Today's PHQ-2 Score:       7/14/2024     2:46 PM   PHQ-2 ( 1999 Pfizer)   Q1: Little interest or pleasure in doing things 0   Q2: Feeling down, depressed or hopeless 0   PHQ-2 Score 0     Today's PHQ-9 Score:        3/6/2024     1:01 PM   PHQ-9 SCORE   PHQ-9 Total Score 8         Reanna Crawford, is a 24 year old female, who had a recent ASCUS pap.  HPV Not done Yes prior history of abnormal pap. Here today for colposcopy. Discussed indication, risks of infection and bleeding.    Pap hx:  10/25/22 LSIL pap at 23 yo. Plan pap only in 1 year.  10/17/23 ASCUS pap in 24 yo. Plan 1 year pap  10/23/24 ASCUS pap in 23 yo. Plan colp bef 1/23/35  Her last pap was   Lab Results   Component Value Date    GYNINTERP  10/23/2024     Atypical squamous cells of undetermined significance (ASC-US)    .    PROCEDURE:                                                      Cervix is stained with acetic acid and viewed colposcopically. Squamocolumnar junction is not visualized in it's entirety. no visible lesions, no mosaicism, no punctation, no abnormal vasculature, and visible lesion(s) at 12 o'clock . Biopsy done Yes. Endocervical curretage Not Done       POST PROCEDURE:                                                      IMPRESSION: Patient tolerated procedure well    PLAN : Await the results of the biopsies.  She tolerated the procedure well with minimal discomfort. There were no complications. Patient was discharged in stable condition.    Patient advised to call the clinic if excessive bleeding, pelvic pain, or fever.     Follow-up based on pathology results.  Nida Andrews MD

## 2025-01-23 LAB
PATH REPORT.COMMENTS IMP SPEC: NORMAL
PATH REPORT.FINAL DX SPEC: NORMAL
PATH REPORT.GROSS SPEC: NORMAL
PATH REPORT.MICROSCOPIC SPEC OTHER STN: NORMAL
PATH REPORT.MICROSCOPIC SPEC OTHER STN: NORMAL
PATH REPORT.RELEVANT HX SPEC: NORMAL
PHOTO IMAGE: NORMAL

## 2025-01-28 ENCOUNTER — PATIENT OUTREACH (OUTPATIENT)
Dept: OBGYN | Facility: CLINIC | Age: 25
End: 2025-01-28

## 2025-01-28 PROBLEM — R87.612 LGSIL ON PAP SMEAR OF CERVIX: Status: ACTIVE | Noted: 2022-11-01

## 2025-01-28 NOTE — TELEPHONE ENCOUNTER
1/20/25 [place] Bx: insufficient; ECC: JOSE 1, cannot exclude JOSE 2. Plan repeat pap and colp in 6 months (due 7/20/25)

## 2025-01-30 ENCOUNTER — VIRTUAL VISIT (OUTPATIENT)
Dept: FAMILY MEDICINE | Facility: CLINIC | Age: 25
End: 2025-01-30

## 2025-01-30 DIAGNOSIS — J45.41 MODERATE PERSISTENT ASTHMA WITH EXACERBATION: Primary | ICD-10-CM

## 2025-01-30 RX ORDER — ALBUTEROL SULFATE 90 UG/1
1 INHALANT RESPIRATORY (INHALATION) EVERY 4 HOURS PRN
Qty: 18 G | Refills: 3 | Status: SHIPPED | OUTPATIENT
Start: 2025-01-30

## 2025-01-30 RX ORDER — METHYLPREDNISOLONE 4 MG/1
TABLET ORAL
Qty: 21 TABLET | Refills: 2 | Status: SHIPPED | OUTPATIENT
Start: 2025-01-30

## 2025-01-30 NOTE — PROGRESS NOTES
"Reanna is a 24 year old who is being evaluated via a billable video visit.    What phone number would you like to be contacted at? [phone]   How would you like to obtain your AVS? WISETIVIhart  {If patient encounters technical issues they should call [phone] :845917}    {PROVIDER CHARTING PREFERENCE:619409}    Subjective   Reanna is a 24 year old, presenting for the following health issues:  Establish Care and Asthma  {(!) Visit Details have not yet been documented.  Please enter Visit Details and then use this list to pull in documentation. (Optional):049211}  History of Present Illness       Reason for visit:  Asthma  Symptom onset:  More than a month  Symptom intensity:  Mild  Symptom progression:  Staying the same  Had these symptoms before:  Yes  Has tried/received treatment for these symptoms:  Yes  Previous treatment was successful:  Yes  Prior treatment description:  Ventolin  What makes it worse:  No   She is taking medications regularly.       {SUPERLIST (Optional):641910}  {additonal problems for provider to add (Optional):831695}    {ROS Picklists (Optional):026227}      Objective    Vitals - Patient Reported  Weight (Patient Reported): 106.6 kg (235 lb)  Height (Patient Reported): 63.5 cm (2' 1\")  BMI (Based on Pt Reported Ht/Wt): 264.35        Physical Exam   {video visit exam brief selected:607738}    {Diagnostic Test Results (Optional):249040}      Video-Visit Details    Type of service:  Video Visit   Originating Location (pt. Location): {video visit patient location:258441::\"Home\"}  {PROVIDER LOCATION On-site should be selected for visits conducted from your clinic location or adjoining [facility] hospital, academic office, or other nearby [facility] building. Off-site should be selected for all other provider locations, including home:385626}  Distant Location (provider location):  {virtual location provider:461178}  Platform used for Video Visit: {Virtual Visit Platforms:841858::\"[employer] SolutionsWell\"}  Signed " Electronically by: Mayte Painter MD  {Email feedback regarding this note to [email or web address]   :677412}

## 2025-02-21 ENCOUNTER — HOSPITAL ENCOUNTER (EMERGENCY)
Facility: CLINIC | Age: 25
Discharge: HOME | End: 2025-02-21
Attending: PHYSICIAN ASSISTANT | Admitting: PHYSICIAN ASSISTANT
Payer: MEDICAID

## 2025-02-21 ENCOUNTER — APPOINTMENT (OUTPATIENT)
Dept: CT IMAGING | Facility: CLINIC | Age: 25
End: 2025-02-21
Attending: PHYSICIAN ASSISTANT
Payer: MEDICAID

## 2025-02-21 VITALS
BODY MASS INDEX: 40.75 KG/M2 | TEMPERATURE: 97.3 F | HEART RATE: 81 BPM | SYSTOLIC BLOOD PRESSURE: 135 MMHG | DIASTOLIC BLOOD PRESSURE: 75 MMHG | OXYGEN SATURATION: 97 % | HEIGHT: 63 IN | WEIGHT: 230 LBS | RESPIRATION RATE: 16 BRPM

## 2025-02-21 DIAGNOSIS — K52.9 ACUTE GASTROENTERITIS: ICD-10-CM

## 2025-02-21 LAB
ALBUMIN SERPL BCG-MCNC: 4.3 G/DL (ref 3.5–5.2)
ALBUMIN UR-MCNC: 30 MG/DL
ALP SERPL-CCNC: 78 U/L (ref 40–150)
ALT SERPL W P-5'-P-CCNC: 29 U/L (ref 0–50)
ANION GAP SERPL CALCULATED.3IONS-SCNC: 13 MMOL/L (ref 7–15)
APPEARANCE UR: CLEAR
AST SERPL W P-5'-P-CCNC: 31 U/L (ref 0–45)
BASOPHILS # BLD AUTO: 0 10E3/UL (ref 0–0.2)
BASOPHILS NFR BLD AUTO: 0 %
BILIRUB SERPL-MCNC: 0.8 MG/DL
BILIRUB UR QL STRIP: NEGATIVE
BUN SERPL-MCNC: 13.9 MG/DL (ref 6–20)
CALCIUM SERPL-MCNC: 8.8 MG/DL (ref 8.8–10.4)
CHLORIDE SERPL-SCNC: 105 MMOL/L (ref 98–107)
COLOR UR AUTO: YELLOW
CREAT SERPL-MCNC: 0.91 MG/DL (ref 0.51–0.95)
EGFRCR SERPLBLD CKD-EPI 2021: 90 ML/MIN/1.73M2
EOSINOPHIL # BLD AUTO: 0.1 10E3/UL (ref 0–0.7)
EOSINOPHIL NFR BLD AUTO: 1 %
ERYTHROCYTE [DISTWIDTH] IN BLOOD BY AUTOMATED COUNT: 13.2 % (ref 10–15)
GLUCOSE SERPL-MCNC: 88 MG/DL (ref 70–99)
GLUCOSE UR STRIP-MCNC: NEGATIVE MG/DL
HCG SERPL QL: NEGATIVE
HCO3 SERPL-SCNC: 21 MMOL/L (ref 22–29)
HCT VFR BLD AUTO: 41.6 % (ref 35–47)
HGB BLD-MCNC: 14.1 G/DL (ref 11.7–15.7)
HGB UR QL STRIP: ABNORMAL
IMM GRANULOCYTES # BLD: 0.1 10E3/UL
IMM GRANULOCYTES NFR BLD: 0 %
KETONES UR STRIP-MCNC: ABNORMAL MG/DL
LEUKOCYTE ESTERASE UR QL STRIP: NEGATIVE
LIPASE SERPL-CCNC: 18 U/L (ref 13–60)
LYMPHOCYTES # BLD AUTO: 0.8 10E3/UL (ref 0.8–5.3)
LYMPHOCYTES NFR BLD AUTO: 6 %
MCH RBC QN AUTO: 31.9 PG (ref 26.5–33)
MCHC RBC AUTO-ENTMCNC: 33.9 G/DL (ref 31.5–36.5)
MCV RBC AUTO: 94 FL (ref 78–100)
MONOCYTES # BLD AUTO: 0.5 10E3/UL (ref 0–1.3)
MONOCYTES NFR BLD AUTO: 4 %
MUCOUS THREADS #/AREA URNS LPF: PRESENT /LPF
NEUTROPHILS # BLD AUTO: 11.6 10E3/UL (ref 1.6–8.3)
NEUTROPHILS NFR BLD AUTO: 89 %
NITRATE UR QL: NEGATIVE
NRBC # BLD AUTO: 0 10E3/UL
NRBC BLD AUTO-RTO: 0 /100
PH UR STRIP: 6 [PH] (ref 5–7)
PLATELET # BLD AUTO: 265 10E3/UL (ref 150–450)
POTASSIUM SERPL-SCNC: 4.3 MMOL/L (ref 3.4–5.3)
PROT SERPL-MCNC: 7.8 G/DL (ref 6.4–8.3)
RBC # BLD AUTO: 4.42 10E6/UL (ref 3.8–5.2)
RBC URINE: 4 /HPF
SODIUM SERPL-SCNC: 139 MMOL/L (ref 135–145)
SP GR UR STRIP: 1.03 (ref 1–1.03)
SQUAMOUS EPITHELIAL: 1 /HPF
UROBILINOGEN UR STRIP-MCNC: NORMAL MG/DL
WBC # BLD AUTO: 13.1 10E3/UL (ref 4–11)
WBC URINE: 1 /HPF

## 2025-02-21 PROCEDURE — 84703 CHORIONIC GONADOTROPIN ASSAY: CPT | Performed by: PHYSICIAN ASSISTANT

## 2025-02-21 PROCEDURE — 74177 CT ABD & PELVIS W/CONTRAST: CPT

## 2025-02-21 PROCEDURE — 258N000003 HC RX IP 258 OP 636: Performed by: PHYSICIAN ASSISTANT

## 2025-02-21 PROCEDURE — 85025 COMPLETE CBC W/AUTO DIFF WBC: CPT | Performed by: EMERGENCY MEDICINE

## 2025-02-21 PROCEDURE — 36415 COLL VENOUS BLD VENIPUNCTURE: CPT | Performed by: EMERGENCY MEDICINE

## 2025-02-21 PROCEDURE — 85025 COMPLETE CBC W/AUTO DIFF WBC: CPT | Performed by: PHYSICIAN ASSISTANT

## 2025-02-21 PROCEDURE — 250N000009 HC RX 250: Performed by: PHYSICIAN ASSISTANT

## 2025-02-21 PROCEDURE — 82040 ASSAY OF SERUM ALBUMIN: CPT | Performed by: PHYSICIAN ASSISTANT

## 2025-02-21 PROCEDURE — 250N000011 HC RX IP 250 OP 636: Performed by: PHYSICIAN ASSISTANT

## 2025-02-21 PROCEDURE — 99285 EMERGENCY DEPT VISIT HI MDM: CPT | Mod: 25

## 2025-02-21 PROCEDURE — 96375 TX/PRO/DX INJ NEW DRUG ADDON: CPT

## 2025-02-21 PROCEDURE — 84703 CHORIONIC GONADOTROPIN ASSAY: CPT | Performed by: EMERGENCY MEDICINE

## 2025-02-21 PROCEDURE — 80053 COMPREHEN METABOLIC PANEL: CPT | Performed by: PHYSICIAN ASSISTANT

## 2025-02-21 PROCEDURE — 96361 HYDRATE IV INFUSION ADD-ON: CPT

## 2025-02-21 PROCEDURE — 85018 HEMOGLOBIN: CPT | Performed by: EMERGENCY MEDICINE

## 2025-02-21 PROCEDURE — 96374 THER/PROPH/DIAG INJ IV PUSH: CPT | Mod: 59

## 2025-02-21 PROCEDURE — 81001 URINALYSIS AUTO W/SCOPE: CPT | Performed by: PHYSICIAN ASSISTANT

## 2025-02-21 PROCEDURE — 83690 ASSAY OF LIPASE: CPT | Performed by: PHYSICIAN ASSISTANT

## 2025-02-21 RX ORDER — ONDANSETRON 2 MG/ML
4 INJECTION INTRAMUSCULAR; INTRAVENOUS ONCE
Status: COMPLETED | OUTPATIENT
Start: 2025-02-21 | End: 2025-02-21

## 2025-02-21 RX ORDER — LOPERAMIDE HYDROCHLORIDE 2 MG/1
2 TABLET ORAL 4 TIMES DAILY PRN
Qty: 20 TABLET | Refills: 0 | Status: SHIPPED | OUTPATIENT
Start: 2025-02-21

## 2025-02-21 RX ORDER — IOPAMIDOL 755 MG/ML
115 INJECTION, SOLUTION INTRAVASCULAR ONCE
Status: COMPLETED | OUTPATIENT
Start: 2025-02-21 | End: 2025-02-21

## 2025-02-21 RX ORDER — ONDANSETRON 4 MG/1
4 TABLET, FILM COATED ORAL EVERY 8 HOURS PRN
Qty: 12 TABLET | Refills: 0 | Status: SHIPPED | OUTPATIENT
Start: 2025-02-21

## 2025-02-21 RX ORDER — KETOROLAC TROMETHAMINE 15 MG/ML
15 INJECTION, SOLUTION INTRAMUSCULAR; INTRAVENOUS ONCE
Status: COMPLETED | OUTPATIENT
Start: 2025-02-21 | End: 2025-02-21

## 2025-02-21 RX ADMIN — KETOROLAC TROMETHAMINE 15 MG: 15 INJECTION, SOLUTION INTRAMUSCULAR; INTRAVENOUS at 18:52

## 2025-02-21 RX ADMIN — ONDANSETRON 4 MG: 2 INJECTION, SOLUTION INTRAMUSCULAR; INTRAVENOUS at 18:52

## 2025-02-21 RX ADMIN — SODIUM CHLORIDE 1000 ML: 0.9 INJECTION, SOLUTION INTRAVENOUS at 18:53

## 2025-02-21 RX ADMIN — SODIUM CHLORIDE 73 ML: 9 INJECTION, SOLUTION INTRAVENOUS at 19:06

## 2025-02-21 RX ADMIN — IOPAMIDOL 115 ML: 755 INJECTION, SOLUTION INTRAVENOUS at 19:06

## 2025-02-21 ASSESSMENT — ACTIVITIES OF DAILY LIVING (ADL)
ADLS_ACUITY_SCORE: 41

## 2025-02-21 ASSESSMENT — COLUMBIA-SUICIDE SEVERITY RATING SCALE - C-SSRS
6. HAVE YOU EVER DONE ANYTHING, STARTED TO DO ANYTHING, OR PREPARED TO DO ANYTHING TO END YOUR LIFE?: NO
1. IN THE PAST MONTH, HAVE YOU WISHED YOU WERE DEAD OR WISHED YOU COULD GO TO SLEEP AND NOT WAKE UP?: NO
2. HAVE YOU ACTUALLY HAD ANY THOUGHTS OF KILLING YOURSELF IN THE PAST MONTH?: NO

## 2025-02-21 NOTE — Clinical Note
Reanna Crawford was seen and treated in our emergency department on 2/21/2025.    Please excuse Ms. Crawford for missing work due to medical illness.     Sincerely,     [facility] Emergency Dept

## 2025-02-21 NOTE — LETTER
February 21, 2025      To Whom It May Concern:      Reanna Crawford was seen in our Emergency Department today, 02/21/25.  I expect her condition to improve over the next 3 days.  She may return to work/school when improved.    Sincerely,        Dickson ZAMBRANO RN  Electronically signed     Clear

## 2025-02-21 NOTE — ED TRIAGE NOTES
Patient presents to the ER via EMS. Per report, patient was at urgent care today for complaints of abdominal pain. Patient reports that the abdominal pain started at  1300 and got significantly worse at 1430. Hypertensive for EMS

## 2025-02-22 NOTE — ED PROVIDER NOTES
"  Emergency Department Note      History of Present Illness     Chief Complaint   Abdominal Pain      HPI   Reanna Crawford is a 24 year old female with a history of asthma and lyme disease presenting with abdominal pain. The patient reports suddenly onset upper abdominal pain, emesis and diarrhea since [id number] today. Patient occasionally drinks alcohol with her last drink being a couple of days ago, stating she felt fine at the time. She denies hematuria, dysuria or fevers. She has not had abdominal surgeries such as c-sections. She also denies ill-contacts.      Independent Historian   None    Review of External Notes     Past Medical History     Medical History and Problem List   Anxiety  Asthma  Depression  High cholesterol  History of chlamydia  History of colposcopy  LGSIL on Pap smear of cervix  Lyme disease  Uses oral contraception    Medications   albuterol   norgestrel-ethinyl estradiol     Surgical History   Head stables after fishing hook to the head  Front teeth pulled during childhood     Physical Exam     Patient Vitals for the past 24 hrs:   BP Temp Temp src Pulse Resp SpO2 Height Weight   02/21/25 1758 135/75 97.3  F (36.3  C) Temporal 81 16 97 % 1.6 m (5' 3\") 104.3 kg (230 lb)     Physical Exam  Vitals and nursing note reviewed.   Constitutional:       Appearance: She is not diaphoretic.   HENT:      Mouth/Throat:      Mouth: Mucous membranes are moist.      Pharynx: Oropharynx is clear.   Eyes:      General: No scleral icterus.  Cardiovascular:      Rate and Rhythm: Normal rate and regular rhythm.      Heart sounds: No murmur heard.     No friction rub. No gallop.   Pulmonary:      Effort: Pulmonary effort is normal. No respiratory distress.      Breath sounds: Normal breath sounds. No stridor. No wheezing, rhonchi or rales.   Abdominal:      Palpations: Abdomen is soft.      Tenderness: There is abdominal tenderness in the right upper quadrant, right lower quadrant and periumbilical area. There " is no guarding or rebound. Negative signs include McBurney's sign.   Neurological:      Mental Status: She is alert and oriented to person, place, and time.   Psychiatric:         Mood and Affect: Mood normal.         Behavior: Behavior normal.         Diagnostics     Lab Results   Labs Ordered and Resulted from Time of ED Arrival to Time of ED Departure   COMPREHENSIVE METABOLIC PANEL - Abnormal       Result Value    Sodium 139      Potassium 4.3      Carbon Dioxide (CO2) 21 (*)     Anion Gap 13      Urea Nitrogen 13.9      Creatinine 0.91      GFR Estimate 90      Calcium 8.8      Chloride 105      Glucose 88      Alkaline Phosphatase 78      AST 31      ALT 29      Protein Total 7.8      Albumin 4.3      Bilirubin Total 0.8     CBC WITH PLATELETS AND DIFFERENTIAL - Abnormal    WBC Count 13.1 (*)     RBC Count 4.42      Hemoglobin 14.1      Hematocrit 41.6      MCV 94      MCH 31.9      MCHC 33.9      RDW 13.2      Platelet Count 265      % Neutrophils 89      % Lymphocytes 6      % Monocytes 4      % Eosinophils 1      % Basophils 0      % Immature Granulocytes 0      NRBCs per 100 WBC 0      Absolute Neutrophils 11.6 (*)     Absolute Lymphocytes 0.8      Absolute Monocytes 0.5      Absolute Eosinophils 0.1      Absolute Basophils 0.0      Absolute Immature Granulocytes 0.1      Absolute NRBCs 0.0     ROUTINE UA WITH MICROSCOPIC REFLEX TO CULTURE - Abnormal    Color Urine Yellow      Appearance Urine Clear      Glucose Urine Negative      Bilirubin Urine Negative      Ketones Urine Trace (*)     Specific Gravity Urine 1.030      Blood Urine Small (*)     pH Urine 6.0      Protein Albumin Urine 30 (*)     Urobilinogen Urine Normal      Nitrite Urine Negative      Leukocyte Esterase Urine Negative      Mucus Urine Present (*)     RBC Urine 4 (*)     WBC Urine 1      Squamous Epithelials Urine 1     LIPASE - Normal    Lipase 18     HCG QUALITATIVE PREGNANCY - Normal    hCG Serum Qualitative Negative         Imaging    CT Abdomen Pelvis w Contrast   Final Result   IMPRESSION:    1.  Diarrheal state.   2.  Mild hepatomegaly.   3.  Normal appendix.          Independent Interpretation   None    ED Course      Medications Administered   Medications   ketorolac (TORADOL) injection 15 mg (15 mg Intravenous $Given 2/21/25 1852)   sodium chloride 0.9% BOLUS 1,000 mL (0 mLs Intravenous Stopped 2/21/25 2053)   ondansetron (ZOFRAN) injection 4 mg (4 mg Intravenous $Given 2/21/25 1852)   iopamidol (ISOVUE-370) solution 115 mL (115 mLs Intravenous $Given 2/21/25 1906)   Saline Flush - CT (73 mLs Intravenous $Given 2/21/25 1906)       Procedures   Procedures     Discussion of Management   None    ED Course   ED Course as of 02/21/25 2251 Fri Feb 21, 2025 1845 I obtained the history and examined the patient as noted above.         Additional Documentation  None    Medical Decision Making / Diagnosis     CMS Diagnoses: None    [facility]   eRanna Crawford is a 24 year old female presents with acute onset of vomiting and diarrhea and right side abdominal pain.  Broad differential considered include but limited cholecystitis, pancreatitis, bowel obstruction, appendicitis, diverticulitis, pregnancy related complications, among others.  CT imaging of her abdomen shows no concerning acute surgical or abdominal catastrophe.  Metabolic panel without concerning metabolic derangements.  Liver function and biliary testing are normal.  UA is consistent with dehydration and contamination no concerns for infection.  She is not pregnant.  Patient improved with antiemetics and IV fluids.  She should continue staying hydrated at home will offer her antiemetics antidiarrheals.  At this time I suspect she has underlying viral gastroenteritis.There has been no travel or antibiotic exposure to suggest more concerning cause of diarrhea, and there has been no hematemesis or BRBPR/melena. I believe she is safe for discharge in improved condition at this  time with strict return precautions for recurrent vomiting, pain, fever or any other concerning symptoms.     Disposition   The patient was discharged.     Diagnosis     ICD-10-CM    1. Acute gastroenteritis  K52.9            Discharge Medications   Discharge Medication List as of 2/21/2025  8:54 PM        START taking these medications    Details   loperamide (IMODIUM A-D) 2 MG tablet Take 1 tablet (2 mg) by mouth 4 times daily as needed for diarrhea., Disp-20 tablet, R-0, Local Print      ondansetron (ZOFRAN) 4 MG tablet Take 1 tablet (4 mg) by mouth every 8 hours as needed for nausea., Disp-12 tablet, R-0, Local Print           Scribe Disclosure:  I, Pamela Thurston, am serving as a scribe at 6:54 PM on 2/21/2025 to document services personally performed by Fish Rosado, TOM based on my observations and the provider's statements to me.        Fish Rosado PA-C  02/21/25 [id number]

## 2025-06-04 DIAGNOSIS — J45.41 MODERATE PERSISTENT ASTHMA WITH EXACERBATION: ICD-10-CM

## 2025-06-04 RX ORDER — ALBUTEROL SULFATE 90 UG/1
AEROSOL, METERED RESPIRATORY (INHALATION)
Qty: 18 G | Refills: 3 | Status: SHIPPED | OUTPATIENT
Start: 2025-06-04

## 2025-06-10 ENCOUNTER — OFFICE VISIT (OUTPATIENT)
Dept: FAMILY MEDICINE | Facility: CLINIC | Age: 25
End: 2025-06-10
Payer: COMMERCIAL

## 2025-06-10 VITALS
TEMPERATURE: 96.9 F | RESPIRATION RATE: 14 BRPM | DIASTOLIC BLOOD PRESSURE: 75 MMHG | BODY MASS INDEX: 43.94 KG/M2 | SYSTOLIC BLOOD PRESSURE: 125 MMHG | HEART RATE: 67 BPM | OXYGEN SATURATION: 97 % | HEIGHT: 63 IN | WEIGHT: 248 LBS

## 2025-06-10 DIAGNOSIS — R25.3 EYE MUSCLE TWITCHES: Primary | ICD-10-CM

## 2025-06-10 DIAGNOSIS — G44.219 EPISODIC TENSION-TYPE HEADACHE, NOT INTRACTABLE: ICD-10-CM

## 2025-06-10 DIAGNOSIS — J45.990 EXERCISE-INDUCED ASTHMA: ICD-10-CM

## 2025-06-10 DIAGNOSIS — F43.9 SITUATIONAL STRESS: ICD-10-CM

## 2025-06-10 PROCEDURE — G2211 COMPLEX E/M VISIT ADD ON: HCPCS | Performed by: PHYSICIAN ASSISTANT

## 2025-06-10 PROCEDURE — 99214 OFFICE O/P EST MOD 30 MIN: CPT | Performed by: PHYSICIAN ASSISTANT

## 2025-06-10 RX ORDER — CETIRIZINE HYDROCHLORIDE 10 MG/1
10 TABLET ORAL DAILY
Qty: 90 TABLET | Refills: 1 | Status: SHIPPED | OUTPATIENT
Start: 2025-06-10

## 2025-06-10 RX ORDER — ALBUTEROL SULFATE 0.83 MG/ML
2.5 SOLUTION RESPIRATORY (INHALATION) EVERY 6 HOURS PRN
Qty: 3 ML | Refills: 3 | Status: SHIPPED | OUTPATIENT
Start: 2025-06-10

## 2025-06-10 ASSESSMENT — ASTHMA QUESTIONNAIRES
QUESTION_5 LAST FOUR WEEKS HOW WOULD YOU RATE YOUR ASTHMA CONTROL: SOMEWHAT CONTROLLED
QUESTION_2 LAST FOUR WEEKS HOW OFTEN HAVE YOU HAD SHORTNESS OF BREATH: MORE THAN ONCE A DAY
QUESTION_3 LAST FOUR WEEKS HOW OFTEN DID YOUR ASTHMA SYMPTOMS (WHEEZING, COUGHING, SHORTNESS OF BREATH, CHEST TIGHTNESS OR PAIN) WAKE YOU UP AT NIGHT OR EARLIER THAN USUAL IN THE MORNING: ONCE A WEEK
QUESTION_1 LAST FOUR WEEKS HOW MUCH OF THE TIME DID YOUR ASTHMA KEEP YOU FROM GETTING AS MUCH DONE AT WORK, SCHOOL OR AT HOME: MOST OF THE TIME
QUESTION_4 LAST FOUR WEEKS HOW OFTEN HAVE YOU USED YOUR RESCUE INHALER OR NEBULIZER MEDICATION (SUCH AS ALBUTEROL): THREE OR MORE TIMES PER DAY
ACT_TOTALSCORE: 10

## 2025-06-10 ASSESSMENT — PAIN SCALES - GENERAL: PAINLEVEL_OUTOF10: NO PAIN (0)

## 2025-06-10 ASSESSMENT — ENCOUNTER SYMPTOMS: EYE PAIN: 1

## 2025-06-10 NOTE — PATIENT INSTRUCTIONS
Reanna, j carlos to meet you this morning.      some artifical tears, use them 3-4 times a day until symptoms are improving and while on the computer often.     Drink more water!  Avoid straining your eyes.   Reduce stress as able.   Consider seeing an ophthalmologist if things aren't getting better.     Albuterol neb solution is available at the pharmacy. Please also start using the daily allergy medication.     Marla Davidson PA-C  Same Day Provider  [facility]

## 2025-06-10 NOTE — PROGRESS NOTES
Assessment & Plan      Assessment & Plan  Eye muscle twitches  Normal neuro exam without associated visual changes/loss  Discussed recommendation to avoid eye strain, trial artifical tears with likely associated eye dryness with screen use, reduce stress as able.        Exercise-induced asthma  Longstanding mild intermittent asthma, worsening of late with poor air quality.   Currently using albuterol inhaler, request to also have neb solution on hand.   RTC if quality of breathing worsens.   Orders:    cetirizine (ZYRTEC) 10 MG tablet; Take 1 tablet (10 mg) by mouth daily.    albuterol (PROVENTIL) (2.5 MG/3ML) 0.083% neb solution; Take 1 vial (2.5 mg) by nebulization every 6 hours as needed for shortness of breath, wheezing or cough.    Situational stress  As related to living situation, no concern for safety, ultimately situation may resolve itself in near future.   Discussed CBT generally today and various benefits if needed in future.        Episodic tension-type headache, not intractable  By history and with lack of response to triptan, consider if patient has tension like headaches rather than previously suspected migraines. She is neurologically intact today without ongoing HA sxs.          Follow-up   if symptoms worsen or fail to improve     Subjective   Reanna is a 25 year old, presenting for the following health issues:  Eye Problem (right)        6/10/2025     9:48 AM   Additional Questions   Roomed by Justine     Eye Problem     History of Present Illness       Reason for visit:  Right eyelid twitch a week nonstop  Symptom onset:  1-2 weeks ago  Symptoms include:  Eyelid twitch  Symptom intensity:  Moderate  Symptom progression:  Staying the same  Had these symptoms before:  No  What makes it worse:  No  What makes it better:  Sleep   She is taking medications regularly.          Review of Systems  Constitutional, HEENT, cardiovascular, pulmonary, gi and gu systems are negative, except as otherwise  "noted.      Objective    /75 (BP Location: Right arm, Patient Position: Sitting, Cuff Size: Adult Large)   Pulse 67   Temp 96.9  F (36.1  C) (Temporal)   Resp 14   Ht 1.6 m (5' 3\")   Wt 112.5 kg (248 lb)   LMP 06/04/2025 (Exact Date)   SpO2 97%   BMI 43.93 kg/m    Body mass index is 43.93 kg/m .  Physical Exam   GENERAL: alert and no distress  EYES: Eyes grossly normal to inspection, PERRL and conjunctivae and sclerae normal  HENT: ear canals and TM's normal, nose and mouth without ulcers or lesions  NECK: no adenopathy, no asymmetry, masses, or scars  RESP: lungs clear to auscultation - no rales, rhonchi or wheezes  CV: regular rate and rhythm, normal S1 S2, no S3 or S4, no murmur, click or rub, no peripheral edema  MS: no gross musculoskeletal defects noted, no edema  SKIN: no suspicious lesions or rashes  NEURO: Normal strength and tone, mentation intact and speech normal  PSYCH: mentation appears normal, affect normal/bright          Signed Electronically by: Marla Henry PA-C    "

## 2025-07-07 DIAGNOSIS — Z01.812 PRE-PROCEDURE LAB EXAM: Primary | ICD-10-CM

## 2025-07-23 ENCOUNTER — PATIENT OUTREACH (OUTPATIENT)
Dept: OBGYN | Facility: CLINIC | Age: 25
End: 2025-07-23
Payer: COMMERCIAL

## 2025-07-23 PROBLEM — N87.0 DYSPLASIA OF CERVIX, LOW GRADE (CIN 1): Status: ACTIVE | Noted: 2022-11-01
